# Patient Record
Sex: FEMALE | Race: WHITE | NOT HISPANIC OR LATINO | Employment: OTHER | URBAN - METROPOLITAN AREA
[De-identification: names, ages, dates, MRNs, and addresses within clinical notes are randomized per-mention and may not be internally consistent; named-entity substitution may affect disease eponyms.]

---

## 2017-01-17 ENCOUNTER — PATIENT OUTREACH (OUTPATIENT)
Dept: OTHER | Facility: OTHER | Age: 67
End: 2017-01-17
Payer: MEDICARE

## 2017-01-17 NOTE — PROGRESS NOTES
Last 5 Patient Entered Readings                                                               Current 30 Day Average: 137/74     Recent Readings 1/15/2017 1/15/2017 1/14/2017 1/13/2017 1/12/2017    Systolic BP (mmHg) 123 121 136 152 132    Diastolic BP (mmHg) 70 69 63 81 70        Outpatient Hypertension Medications as of 1/17/2017             amlodipine-valsartan-hcthiazid -25 mg Tab Take 1 tablet by mouth every evening.    carvedilol (COREG) 25 MG tablet Take 1 tablet (25 mg total) by mouth 2 (two) times daily with meals.    spironolactone (ALDACTONE) 25 MG tablet Take 1 tablet (25mg total) by mouth every morning for 1 week.  If well tolerated, increase to 2 tablets (50mg total) by mouth every morning.        Plan:   Called patient to follow up. Per current 30 day average, BP is well controlled. Patient denies having questions or concerns. Will continue to monitor. WCB in 3 months, sooner if BP begins to trend up or down.

## 2017-01-20 ENCOUNTER — PATIENT OUTREACH (OUTPATIENT)
Dept: OTHER | Facility: OTHER | Age: 67
End: 2017-01-20
Payer: MEDICARE

## 2017-01-20 NOTE — PROGRESS NOTES
"Last 5 Patient Entered Readings                                                               Current 30 Day Average: 137/73     Recent Readings 1/20/2017 1/19/2017 1/18/2017 1/15/2017 1/15/2017    Systolic BP (mmHg) 152 143 142 123 121    Diastolic BP (mmHg) 79 74 81 70 69    Pulse 91 87 86 78 79        Follow up with Ms. Vickie Hoskins completed. Patient is doing well. She hasn't been able to exercise lately because the weather has "nasty". Her mother-in-law also had knee replacement surgery so she has been helping take care of her. She plans on getting back soon. Ms. Hoskins reports following a low sodium diet. Patient did not have any further questions or concerns. I will follow up in a few weeks to see how she is doing and progressing.            "

## 2017-01-23 DIAGNOSIS — K21.9 GASTROESOPHAGEAL REFLUX DISEASE WITHOUT ESOPHAGITIS: Chronic | ICD-10-CM

## 2017-01-23 RX ORDER — OMEPRAZOLE 40 MG/1
CAPSULE, DELAYED RELEASE ORAL
Qty: 30 CAPSULE | Refills: 0 | Status: SHIPPED | OUTPATIENT
Start: 2017-01-23 | End: 2017-03-24 | Stop reason: SDUPTHER

## 2017-02-10 ENCOUNTER — PATIENT OUTREACH (OUTPATIENT)
Dept: OTHER | Facility: OTHER | Age: 67
End: 2017-02-10
Payer: MEDICARE

## 2017-02-16 ENCOUNTER — PATIENT MESSAGE (OUTPATIENT)
Dept: FAMILY MEDICINE | Facility: CLINIC | Age: 67
End: 2017-02-16

## 2017-02-16 DIAGNOSIS — F32.2 MAJOR DEPRESSIVE DISORDER, SINGLE EPISODE, SEVERE WITHOUT PSYCHOTIC FEATURES: ICD-10-CM

## 2017-02-16 DIAGNOSIS — I10 ESSENTIAL HYPERTENSION: ICD-10-CM

## 2017-02-16 DIAGNOSIS — K21.9 GASTROESOPHAGEAL REFLUX DISEASE WITHOUT ESOPHAGITIS: Chronic | ICD-10-CM

## 2017-02-16 RX ORDER — DULOXETIN HYDROCHLORIDE 60 MG/1
60 CAPSULE, DELAYED RELEASE ORAL DAILY
Qty: 90 CAPSULE | Refills: 0 | Status: SHIPPED | OUTPATIENT
Start: 2017-02-16 | End: 2017-04-19 | Stop reason: SDUPTHER

## 2017-02-16 RX ORDER — SPIRONOLACTONE 25 MG/1
TABLET ORAL
Qty: 180 TABLET | Refills: 3 | Status: SHIPPED | OUTPATIENT
Start: 2017-02-16 | End: 2017-05-11 | Stop reason: SDUPTHER

## 2017-02-16 RX ORDER — OMEPRAZOLE 40 MG/1
40 CAPSULE, DELAYED RELEASE ORAL DAILY
Qty: 90 CAPSULE | Refills: 3 | Status: SHIPPED | OUTPATIENT
Start: 2017-02-16

## 2017-02-17 DIAGNOSIS — E11.9 TYPE II OR UNSPECIFIED TYPE DIABETES MELLITUS WITHOUT MENTION OF COMPLICATION, NOT STATED AS UNCONTROLLED: Primary | ICD-10-CM

## 2017-02-17 NOTE — TELEPHONE ENCOUNTER
Left message for patient to call our office.    Spoke w/ patient, she is requesting to see a eye doctor. A referral was already placed on today for Optometry by Dr. Graff.

## 2017-02-23 ENCOUNTER — OFFICE VISIT (OUTPATIENT)
Dept: OPTOMETRY | Facility: CLINIC | Age: 67
End: 2017-02-23
Payer: MEDICARE

## 2017-02-23 DIAGNOSIS — E11.9 TYPE 2 DIABETES MELLITUS WITHOUT OPHTHALMIC MANIFESTATIONS: Primary | ICD-10-CM

## 2017-02-23 DIAGNOSIS — H40.003 GLAUCOMA SUSPECT, BILATERAL: ICD-10-CM

## 2017-02-23 DIAGNOSIS — H53.002 AMBLYOPIA, LEFT: ICD-10-CM

## 2017-02-23 DIAGNOSIS — Z96.1 PSEUDOPHAKIA: ICD-10-CM

## 2017-02-23 DIAGNOSIS — H43.811 POSTERIOR VITREOUS DETACHMENT, RIGHT: ICD-10-CM

## 2017-02-23 DIAGNOSIS — H52.7 REFRACTIVE ERROR: ICD-10-CM

## 2017-02-23 DIAGNOSIS — H50.112 EXOTROPIA, LEFT EYE: ICD-10-CM

## 2017-02-23 PROCEDURE — 99999 PR PBB SHADOW E&M-EST. PATIENT-LVL I: CPT | Mod: PBBFAC,,, | Performed by: OPTOMETRIST

## 2017-02-23 PROCEDURE — 92014 COMPRE OPH EXAM EST PT 1/>: CPT | Mod: S$GLB,,, | Performed by: OPTOMETRIST

## 2017-02-23 PROCEDURE — 92015 DETERMINE REFRACTIVE STATE: CPT | Mod: S$GLB,,, | Performed by: OPTOMETRIST

## 2017-02-23 NOTE — LETTER
February 23, 2017      Pari Graff MD  [address]  [place] [address]           [facility]  [address]  [place] [address]  Phone: [phone]  Fax: [phone]          Patient: Vickie Hoskins   MR Number: [id number]   YOB: 1950   Date of Visit: 2/23/2017       Dear Dr. Pari Graff:    Thank you for referring Vickie Hoskins to me for evaluation. Attached you will find relevant portions of my assessment and plan of care.    If you have questions, please do not hesitate to call me. I look forward to following Vickie Hoskins along with you.    Sincerely,    Daniella Fernández, OD    Enclosure  CC:  No Recipients    If you would like to receive this communication electronically, please contact [email or web address] or [phone] to request more information on [employer] Link access.    For providers and/or their staff who would like to refer a patient to [facility], please contact us through our one-stop-shop provider referral line, [facility], at [phone].    If you feel you have received this communication in error or would no longer like to receive these types of communications, please e-mail [email or web address]

## 2017-02-23 NOTE — PROGRESS NOTES
HPI     Dls: 2/9/16 Dr. Servin    Pt here for diabetic and hypertensive eye exam, and glaucoma ck.   Pt c/o yellow spots od for a while now was seeing blue lines in vision not   any more. Pt c/o blurry vision od off/on pt c/o mucous ou tearing ou no   itching no burning no pain ha's. Pt wears pal's.     Eye meds:   None    Hemoglobin A1C       Date                     Value               Ref Range             Status                10/11/2016               6.9 (H)             4.5 - 6.2 %           Final                  04/08/2016               10.2 (H)            4.5 - 6.2 %           Final                 11/03/2015               9.3 (H)             4.5 - 6.2 %           Final            ----------    LBS - reports sugars are up and down due to COPD meds, this morning was   202    Family OHx  (--) glaucoma  (--) AMD      H/o strabismus OS s/p surgery, amblyopia OS       Last edited by Daniella Fernández, OD on 2/23/2017  1:36 PM.     Assessment /Plan     For exam results, see Encounter Report.    Type 2 diabetes mellitus without ophthalmic manifestations  - No diabetic retinopathy. Educated patient on importance of good blood sugar control, compliance with meds, and follow up care with PCP. Return in 1 year for dilated eye exam, sooner PRN.    Posterior vitreous detachment, right  - Discussed causes of flashes and floaters with the patient and described the warnings of a possible retinal detachment. Advised patient to call if there is an increase in flashes or floaters    Glaucoma suspect, bilateral  - H/o large CDR - stable, healthy rim. Monitor.     Pseudophakia  - Well-centered. Clear.     Exotropia, left eye  - S/p surgery as a child. Monitor.     Amblyopia, left  - BCVA OS 20/40. Monitor.    Refractive error  - Educated patient on refractive error and discussed lens options. Gave pt Rx for specs. RTC in 1 year.    Eyeglass Final Rx     Eyeglass Final Rx      Sphere Cylinder Axis Add   Right -1.50 +0.50 005 +2.50   Left  -1.25 +1.50 170 +2.50       Type:  PAL    Expiration Date:  2/24/2018              rtc 1 yr

## 2017-03-01 ENCOUNTER — PATIENT OUTREACH (OUTPATIENT)
Dept: OTHER | Facility: OTHER | Age: 67
End: 2017-03-01
Payer: MEDICARE

## 2017-03-01 NOTE — PROGRESS NOTES
Last 5 Patient Entered Readings                                                               Current 30 Day Average: 142/76     Recent Readings 2/28/2017 2/26/2017 2/26/2017 2/24/2017 2/23/2017    Systolic BP (mmHg) 153 150 120 154 149    Diastolic BP (mmHg) 78 82 86 80 82    Pulse 79 90 90 83 93        Follow up with Ms. Vickie Hoskins completed. Ms. Hoskins is unsure of what could be causing her readings to be elevated. She has been monitoring her sodium intake, but she has not been exercising over the past few weeks. She is still helping take care of her mother-in-law following knee surgery. Patient did not have any further questions or concerns. I will follow up in a few weeks to see how she is doing and progressing.

## 2017-03-15 ENCOUNTER — PATIENT OUTREACH (OUTPATIENT)
Dept: OTHER | Facility: OTHER | Age: 67
End: 2017-03-15
Payer: MEDICARE

## 2017-03-15 DIAGNOSIS — I10 ESSENTIAL HYPERTENSION: ICD-10-CM

## 2017-03-15 RX ORDER — AMLODIPINE, VALSARTAN AND HYDROCHLOROTHIAZIDE 10; 320; 25 MG/1; MG/1; MG/1
TABLET ORAL
Qty: 90 TABLET | Refills: 1 | Status: SHIPPED | OUTPATIENT
Start: 2017-03-15 | End: 2017-03-17

## 2017-03-15 NOTE — PROGRESS NOTES
Last 5 Patient Entered Readings                                                               Current 30 Day Average: 143/76     Recent Readings 3/14/2017 3/12/2017 3/12/2017 3/9/2017 3/7/2017    Systolic BP (mmHg) 124 120 129 143 146    Diastolic BP (mmHg) 70 69 58 76 81    Pulse 78 86 86 87 86        Follow up with Ms. Vickie Hoskins completed. Ms. Hoskins is doing well. Patient reports that she has not been able to get back to the gym, but she has been walking around her neighborhood. She is maintaining her diet. Patient did not have any further questions or concerns. I will follow up in a few weeks to see how she is doing and progressing.

## 2017-03-16 ENCOUNTER — PATIENT MESSAGE (OUTPATIENT)
Dept: FAMILY MEDICINE | Facility: CLINIC | Age: 67
End: 2017-03-16

## 2017-03-17 ENCOUNTER — OFFICE VISIT (OUTPATIENT)
Dept: FAMILY MEDICINE | Facility: CLINIC | Age: 67
End: 2017-03-17
Payer: MEDICARE

## 2017-03-17 VITALS
BODY MASS INDEX: 36.78 KG/M2 | RESPIRATION RATE: 18 BRPM | DIASTOLIC BLOOD PRESSURE: 80 MMHG | SYSTOLIC BLOOD PRESSURE: 142 MMHG | HEART RATE: 86 BPM | OXYGEN SATURATION: 94 % | WEIGHT: 207.56 LBS | HEIGHT: 63 IN

## 2017-03-17 DIAGNOSIS — Z79.4 TYPE 2 DIABETES MELLITUS WITH DIABETIC NEUROPATHY, WITH LONG-TERM CURRENT USE OF INSULIN: Chronic | ICD-10-CM

## 2017-03-17 DIAGNOSIS — E66.01 SEVERE OBESITY (BMI 35.0-35.9 WITH COMORBIDITY): ICD-10-CM

## 2017-03-17 DIAGNOSIS — Z00.00 ENCOUNTER FOR PREVENTIVE HEALTH EXAMINATION: Primary | ICD-10-CM

## 2017-03-17 DIAGNOSIS — G47.33 OSA (OBSTRUCTIVE SLEEP APNEA): Chronic | ICD-10-CM

## 2017-03-17 DIAGNOSIS — F33.2 MAJOR DEPRESSIVE DISORDER, RECURRENT, SEVERE WITHOUT PSYCHOTIC FEATURES: Chronic | ICD-10-CM

## 2017-03-17 DIAGNOSIS — F17.201 TOBACCO DEPENDENCE IN REMISSION: ICD-10-CM

## 2017-03-17 DIAGNOSIS — E11.3299 DIABETES MELLITUS WITH BACKGROUND RETINOPATHY: Chronic | ICD-10-CM

## 2017-03-17 DIAGNOSIS — I10 ESSENTIAL HYPERTENSION: Chronic | ICD-10-CM

## 2017-03-17 DIAGNOSIS — E78.5 HYPERLIPIDEMIA, UNSPECIFIED HYPERLIPIDEMIA TYPE: Chronic | ICD-10-CM

## 2017-03-17 DIAGNOSIS — K21.9 GASTROESOPHAGEAL REFLUX DISEASE WITHOUT ESOPHAGITIS: Chronic | ICD-10-CM

## 2017-03-17 DIAGNOSIS — J44.9 MODERATE COPD (CHRONIC OBSTRUCTIVE PULMONARY DISEASE): ICD-10-CM

## 2017-03-17 DIAGNOSIS — I77.1 TORTUOUS AORTA: Chronic | ICD-10-CM

## 2017-03-17 DIAGNOSIS — M89.9 DISORDER OF BONE: ICD-10-CM

## 2017-03-17 DIAGNOSIS — K76.0 NAFLD (NONALCOHOLIC FATTY LIVER DISEASE): Chronic | ICD-10-CM

## 2017-03-17 DIAGNOSIS — E03.9 ACQUIRED HYPOTHYROIDISM: Chronic | ICD-10-CM

## 2017-03-17 DIAGNOSIS — E11.40 TYPE 2 DIABETES MELLITUS WITH DIABETIC NEUROPATHY, WITH LONG-TERM CURRENT USE OF INSULIN: Chronic | ICD-10-CM

## 2017-03-17 PROCEDURE — 3077F SYST BP >= 140 MM HG: CPT | Mod: S$GLB,,, | Performed by: NURSE PRACTITIONER

## 2017-03-17 PROCEDURE — 3079F DIAST BP 80-89 MM HG: CPT | Mod: S$GLB,,, | Performed by: NURSE PRACTITIONER

## 2017-03-17 PROCEDURE — 99499 UNLISTED E&M SERVICE: CPT | Mod: S$GLB,,, | Performed by: NURSE PRACTITIONER

## 2017-03-17 PROCEDURE — 99999 PR PBB SHADOW E&M-EST. PATIENT-LVL V: CPT | Mod: PBBFAC,,, | Performed by: NURSE PRACTITIONER

## 2017-03-17 PROCEDURE — G0439 PPPS, SUBSEQ VISIT: HCPCS | Mod: S$GLB,,, | Performed by: NURSE PRACTITIONER

## 2017-03-17 RX ORDER — AMLODIPINE BESYLATE 10 MG/1
10 TABLET ORAL DAILY
Qty: 90 TABLET | Refills: 4 | Status: SHIPPED | OUTPATIENT
Start: 2017-03-17 | End: 2017-03-24

## 2017-03-17 RX ORDER — VALSARTAN AND HYDROCHLOROTHIAZIDE 320; 25 MG/1; MG/1
1 TABLET, FILM COATED ORAL DAILY
Qty: 90 TABLET | Refills: 4 | Status: SHIPPED | OUTPATIENT
Start: 2017-03-17 | End: 2017-03-24

## 2017-03-17 NOTE — PATIENT INSTRUCTIONS
Back Care Tips    Caring for your back  These are things you can do to prevent a recurrence of acute back pain and to reduce symptoms from chronic back pain:  · Maintain a healthy weight. If you are overweight, losing weight will help most types of back pain.  · Exercise is an important part of recovery from most types of back pain. The muscles behind and in front of the spine support the back. This means strengthening both the back muscles and the abdominal muscles will provide better support for your spine.   · Swimming and brisk walking are good overall exercises to improve your fitness level.  · Practice safe lifting methods (below).  · Practice good posture when sitting, standing and walking. Avoid prolonged sitting. This puts more stress on the lower back than standing or walking.  · Wear quality shoes with sufficient arch support. Foot and ankle alignment can affect back symptoms. Women should avoid wearing high heels.  · Therapeutic massage can help relax the back muscles without stretching them.  · During the first 24 to 72 hours after an acute injury or flare-up of chronic back pain, apply an ice pack to the painful area for 20 minutes and then remove it for 20 minutes, over a period of 60 to 90 minutes, or several times a day. As a safety precaution, do not use a heating pad at bedtime. Sleeping on a heating pad can lead to skin burns or tissue damage.  · You can alternate ice and heat therapies.  Medications  Talk to your healthcare provider before using medicines, especially if you have other medical problems or are taking other medicines.  · You may use acetaminophen or ibuprofen to control pain, unless your healthcare provider prescribed other pain medicine. If you have chronic conditions like diabetes, liver or kidney disease, stomach ulcers, or gastrointestinal bleeding, or are taking blood thinners, talk with your healthcare provider before taking any medicines.  · Be careful if you are given  prescription pain medicines, narcotics, or medicine for muscle spasm. They can cause drowsiness, affect your coordination, reflexes, and judgment. Do not drive or operate heavy machinery while taking these types of medicines. Take prescription pain medicine only as prescribed by your healthcare provider.  Lumbar stretch  Here is a simple stretching exercise that will help relax muscle spasm and keep your back more limber. If exercise makes your back pain worse, dont do it.  · Lie on your back with your knees bent and both feet on the ground.  · Slowly raise your left knee to your chest as you flatten your lower back against the floor. Hold for 5 seconds.  · Relax and repeat the exercise with your right knee.  · Do 10 of these exercises for each leg.  Safe lifting method  · Dont bend over at the waist to lift an object off the floor.  Instead, bend your knees and hips in a squat.   · Keep your back and head upright  · Hold the object close to your body, directly in front of you.  · Straighten your legs to lift the object.   · Lower the object to the floor in the reverse fashion.  · If you must slide something across the floor, push it.  Posture tips  Sitting  Sit in chairs with straight backs or low-back support. Keep your knees lower than your hips, with your feet flat on the floor.  When driving, sit up straight. Adjust the seat forward so you are not leaning toward the steering wheel.  A small pillow or rolled towel behind your lower back may help if you are driving long distances.   Standing  When standing for long periods, shift most of your weight to one leg at a time. Alternate legs every few minutes.   Sleeping  The best way to sleep is on your side with your knees bent. Put a low pillow under your head to support your neck in a neutral spine position. Avoid thick pillows that bend your neck to one side. Put a pillow between your legs to further relax your lower back. If you sleep on your back, put pillows  under your knees to support your legs in a slightly flexed position. Use a firm mattress. If your mattress sags, replace it, or use a 1/2-inch plywood board under the mattress to add support.  Follow-up care  Follow up with your healthcare provider, or as advised.  If X-rays, a CT scan or an MRI scan were taken, they will be reviewed by a radiologist. You will be notified of any new findings that may affect your care.  Call 911  Seek emergency medical care if any of the following occur:  · Trouble breathing  · Confusion  · Very drowsy  · Fainting or loss of consciousness  · Rapid or very slow heart rate  · Loss of  bowel or bladder control  When to seek medical care  Call your healthcare provider if any of the following occur:  · Pain becomes worse or spreads to your arms or legs  · Weakness or numbness in one or both arms or legs  · Numbness in the groin area  Date Last Reviewed: 6/1/2016  © 8865-5567 [employer]. [address] [place] [address]. All rights reserved. This information is not intended as a substitute for professional medical care. Always follow your healthcare professional's instructions.        Self-Care for Low Back Pain    Most people have low back pain now and then. In many cases, it isnt serious and self-care can help. Sometimes low back pain can be a sign of a bigger problem. Call your healthcare provider if your pain returns often or gets worse over time. For the long-term care of your back, get regular exercise, lose any excess weight and learn good posture.  Take a short rest  Lying down during the day may be beneficial for short periods of time if severe pain increases with sitting or standing. Long-term bed rest could be detrimental.  Reduce pain and swelling  Cold reduces swelling. Both cold and heat can reduce pain. Protect your skin by placing a towel between your body and the ice or heat source.  · For the first few days, apply an ice pack for 15 to 20 minutes  .  · After the first few days, try heat for 15 minutes at a time to ease pain. Never sleep on a heating pad.  · Over-the-counter medicine can help control pain and swelling. Try aspirin or ibuprofen.  Exercise  Exercise can help your back heal. It also helps your back get stronger and more flexible, preventing any reinjury. Ask your healthcare provider about specific exercises for your back.  Use good posture to avoid reinjury  · When moving, bend at the hips and knees. Dont bend at the waist or twist around.  · When lifting, keep the object close to your body. Dont try to lift more than you can handle.  · When sitting, keep your lower back supported. Use a rolled-up towel as needed.  Seek immediate medical care if:  · Youre unable to stand or walk.  · You have a temperature over 100.4°F (38.0°C)  · You have frequent, painful, or bloody urination.  · You have severe abdominal pain.  · You have a sharp, stabbing pain.  · Your pain is constant.  · You have pain or numbness in your leg.  · You feel pain in a new area of your back.  · You notice that the pain isnt decreasing after more than a week.   Date Last Reviewed: 9/29/2015  © 3232-1855 [employer]. [address] [place] [address], [place] [address]. All rights reserved. This information is not intended as a substitute for professional medical care. Always follow your healthcare professional's instructions.        Counseling and Referral of Other Preventative  (Italic type indicates deductible and co-insurance are waived)    Patient Name: Vickie Hoskins  Today's Date: 3/19/2017      SERVICE LIMITATIONS RECOMMENDATION    Vaccines    · Pneumococcal (once after 65)    · Influenza (annually)    · Hepatitis B (if medium/high risk)    · Prevnar 13      Hepatitis B medium/high risk factors:       - End-stage renal disease       - Hemophiliacs who received Factor VII or         IX concentrates       - Clients of institutions for the mentally             retarded        - Persons who live in the same house as          a HepB carrier       - Homosexual men       - Illicit injectable drug abusers     Pneumococcal: Done, no repeat necessary     Influenza: Done, repeat in one year     Hepatitis B: Done, repeat at next scheduled date     Prevnar 13: Done, repeat at next scheduled date    Mammogram (biennial age 50-74)  Annually (age 40 or over)  completed April 2016, scheduled repeat 1 year     Pap (up to age 70 and after 70 if unknown history or abnormal study last 10 years)    N/A, no clinical risk factors          Colorectal cancer screening (to age 75)    · Fecal occult blood test (annual)  · Flexible sigmoidoscopy (5y)  · Screening colonoscopy (10y)  · Barium enema   completed 2014.     Diabetes self-management training (no USPSTF recommendations)  Requires referral by treating physician for patient with diabetes or renal disease. 10 hours of initial DSMT sessions of no less than 30 minutes each in a continuous 12-month period. 2 hours of follow-up DSMT in subsequent years.  per patient and PCP     Bone mass measurements (age 65 & older, biennial)  Requires diagnosis related to osteoporosis or estrogen deficiency. Biennial benefit unless patient has history of long-term glucocorticoid  ordered.     Glaucoma screening (no USPSTF recommendation)  Diabetes mellitus, family history   , age 50 or over    American, age 65 or over  completed February 2017    Medical nutrition therapy for diabetes or renal disease (no recommended schedule)  Requires referral by treating physician for patient with diabetes or renal disease or kidney transplant within the past 3 years.  Can be provided in same year as diabetes self-management training (DSMT), and CMS recommends medical nutrition therapy take place after DSMT. Up to 3 hours for initial year and 2 hours in subsequent years.  per patient and PCP    Cardiovascular screening blood tests (every 5 years)  · Fasting lipid  panel  Order as a panel if possible  completed April 2016, repeat every 1 year.     Diabetes screening tests (at least every 3 years, Medicare covers annually or at 6-month intervals for prediabetic patients)  · Fasting blood sugar (FBS) or glucose tolerance test (GTT)  Patient must be diagnosed with one of the following:       - Hypertension       - Dyslipidemia       - Obesity (BMI 30kg/m2)       - Previous elevated impaired FBS or GTT       ... or any two of the following:       - Overweight (BMI 25 but <30)       - Family history of diabetes       - Age 65 or older       - History of gestational diabetes or birth of baby weighing more than 9 pounds  completed October 2016 repeated every 6 months     Abdominal aortic aneurysm screening (once)  · Sonogram   Limited to patients who meet one of the following criteria:       - Men who are 65-75 years old and have smoked more than 100 cigarette in their lifetime       - Anyone with a family history of abdominal aortic aneurysm       - Anyone recommended for screening by the USPSTF  completed May 2016    HIV screening (annually for increased risk patients)  · HIV-1 and HIV-2 by EIA, or EDGAR, rapid antibody test or oral mucosa transudate  Patients must be at increased risk for HIV infection per USPSTF guidelines or pregnant. Tests covered annually for patient at increased risk or as requested by the patient. Pregnant patients may receive up to 3 tests during pregnancy.  No clinical risk factors     Smoking cessation counseling (up to 8 sessions per year)  Patients must be asymptomatic of tobacco-related conditions to receive as a preventative service.  Counseled for 3-10 minutes.    Subsequent annual wellness visit  At least 12 months since last AWV  Return in one year     The following information is provided to all patients.  This information is to help you find resources for any of the problems found today that may be affecting your health:                Living  healthy guide: www.[facility].[place].[facility]      Understanding Diabetes: www.diabetes.org      Eating healthy: www.cdc.gov/healthyweight      CDC home safety checklist: www.cdc.gov/steadi/patient.html      Agency on Aging: www.goea.[place].[facility]      Alcoholics anonymous (AA): www.aa.org      Physical Activity: www.gosia.nih.gov/mr6cztj      Tobacco use: www.quitwithusla.org

## 2017-03-20 NOTE — PROGRESS NOTES
"Vickie Hoskins presented for a  Medicare AWV and comprehensive Health Risk Assessment today. The following components were reviewed and updated:    · Medical history  · Family History  · Social history  · Allergies and Current Medications  · Health Risk Assessment  · Health Maintenance  · Care Team     ** See Completed Assessments for Annual Wellness Visit within the encounter summary.**       The following assessments were completed:  · Living Situation  · CAGE  · Depression Screening  · Timed Get Up and Go  · Whisper Test  · Cognitive Function Screening  · Nutrition Screening  · ADL Screening  · PAQ Screening    Vitals:    03/17/17 1330   BP: (!) 142/80   BP Location: Left arm   Patient Position: Sitting   BP Method: Manual   Pulse: 86   Resp: 18   SpO2: (!) 94%   Weight: 94.2 kg (207 lb 9 oz)   Height: 5' 3" (1.6 m)     Body mass index is 36.77 kg/(m^2).  Physical Exam   Constitutional: She is oriented to person, place, and time.   Cardiovascular: Normal rate, regular rhythm and normal heart sounds.    Pulses:       Dorsalis pedis pulses are 2+ on the right side, and 2+ on the left side.        Posterior tibial pulses are 2+ on the right side, and 2+ on the left side.   Pulmonary/Chest: Effort normal.   Musculoskeletal: Normal range of motion. She exhibits no edema.        Right foot: There is normal range of motion and no deformity.        Left foot: There is normal range of motion and no deformity.   Feet:   Right Foot:   Protective Sensation: 6 sites tested. 4 sites sensed.   Skin Integrity: Negative for ulcer, blister, skin breakdown, erythema, warmth, callus or dry skin.   Left Foot:   Protective Sensation: 6 sites tested. 5 sites sensed.   Skin Integrity: Negative for ulcer, blister, skin breakdown, erythema, warmth, callus or dry skin.   Neurological: She is alert and oriented to person, place, and time.   Skin: Skin is warm.   Psychiatric: She has a normal mood and affect. Her behavior is normal. Thought " content normal.   Vitals reviewed.        Diagnoses and health risks identified today and associated recommendations/orders:    1. Encounter for preventive health examination  Education provided about preventive health examinations and procedures; discussed patient's health concerns, holistically addressed patient's health plan.  Reviewed medical record per discussion with patient for health risks, which are addressed in this documentation.     2. Major depressive disorder, recurrent, severe without psychotic features  Stable, evaluated by [facility] psychiatry, denies worsening symptoms, denies homicidal or suicidal ideation, reports medication is therapeutic; continue as advised.     3. Tortuous aorta  Stable, asymptomatic on ASA, STATIN, and antihypertensives; monitor    4. Severe obesity (BMI 35.0-35.9 with comorbidity)  Reminded patient of [employer] benefits with access to fitness centers and classes.   We discussed diet and exercise for weight loss.  Educated about diet, activities, and ways to avoid sedentary lifestyle.   Educated about medications, diet compliance, lifestyle choices, health risks associated with obesity.      5. Type 2 diabetes mellitus with diabetic neuropathy, with long-term current use of insulin  Education provided about diabetes, management of blood glucose with diet and activities, monitoring for worsening effects of diabetes.  Reviewed Ha1c (6.9 - October 2016), complications associated with uncontrolled diabetes.   - Ambulatory referral to Podiatry    6. Type 2 diabetes mellitus, uncontrolled, with neuropathy  Education provided about diabetes, management of blood glucose with diet and activities, monitoring for worsening effects of diabetes.  Reviewed Ha1c (6.9 - October 2016), complications associated with uncontrolled diabetes.   - Ambulatory referral to Podiatry    7. Diabetes mellitus with background retinopathy  Education provided about diabetes, management of blood  glucose with diet and activities, monitoring for worsening effects of diabetes.  Reviewed Ha1c (6.9 - October 2016), complications associated with uncontrolled diabetes. Evaluated by optometry February 2017.     8. Moderate COPD (chronic obstructive pulmonary disease)  Stable, followed by [facility] pulmonology dept, denies worsening symptoms; continue as advised.     9. Tobacco dependence in remission  Symptoms controlled. Continuing current management.    10. Essential hypertension  Stable, patient educated about medications, diet compliance, lifestyle choices, health risks associated with uncontrolled HTN.     11. Acquired hypothyroidism  TSH 0.693 (July 2016). The current medical regimen is effective;  continue present plan and medications.    12. AMBAR (obstructive sleep apnea)  Symptoms controlled. Continuing current management.  Stable, followed by [facility] pulmonology and sleep medicine dept, denies worsening symptoms; continue as advised.     13. Hyperlipidemia, unspecified hyperlipidemia type  Educated about diet, activities, and ways to avoid sedentary lifestyle.   Reminded patient of [employer] benefits with access to fitness centers and classes.   Lipid panel (April 2016) reflects elevated cholesterol. Advised about diet and cholesterol management.     14. NAFLD (nonalcoholic fatty liver disease)  Stable, asymptomatic; monitor.     15. Gastroesophageal reflux disease without esophagitis  Symptoms controlled. Continuing current management.  We discussed trigger foods, postprandial body positioning.     16. Disorder of bone  - DXA Bone Density Spine And Hip; Future      No Follow-up on file.    Dashawn Roman Jr, NP

## 2017-03-24 ENCOUNTER — OFFICE VISIT (OUTPATIENT)
Dept: FAMILY MEDICINE | Facility: CLINIC | Age: 67
End: 2017-03-24
Payer: MEDICARE

## 2017-03-24 VITALS
BODY MASS INDEX: 36.8 KG/M2 | OXYGEN SATURATION: 96 % | TEMPERATURE: 98 F | HEART RATE: 86 BPM | WEIGHT: 207.69 LBS | DIASTOLIC BLOOD PRESSURE: 80 MMHG | SYSTOLIC BLOOD PRESSURE: 118 MMHG | HEIGHT: 63 IN

## 2017-03-24 DIAGNOSIS — E11.3299 DIABETES MELLITUS WITH BACKGROUND RETINOPATHY: Primary | Chronic | ICD-10-CM

## 2017-03-24 DIAGNOSIS — S49.91XA RIGHT SHOULDER INJURY, INITIAL ENCOUNTER: ICD-10-CM

## 2017-03-24 DIAGNOSIS — I10 ESSENTIAL HYPERTENSION: Chronic | ICD-10-CM

## 2017-03-24 DIAGNOSIS — F33.2 MAJOR DEPRESSIVE DISORDER, RECURRENT, SEVERE WITHOUT PSYCHOTIC FEATURES: Chronic | ICD-10-CM

## 2017-03-24 DIAGNOSIS — G89.29 CHRONIC BILATERAL LOW BACK PAIN WITHOUT SCIATICA: ICD-10-CM

## 2017-03-24 DIAGNOSIS — J44.9 MODERATE COPD (CHRONIC OBSTRUCTIVE PULMONARY DISEASE): ICD-10-CM

## 2017-03-24 DIAGNOSIS — E03.9 ACQUIRED HYPOTHYROIDISM: Chronic | ICD-10-CM

## 2017-03-24 DIAGNOSIS — M54.50 CHRONIC BILATERAL LOW BACK PAIN WITHOUT SCIATICA: ICD-10-CM

## 2017-03-24 PROCEDURE — 4010F ACE/ARB THERAPY RXD/TAKEN: CPT | Mod: S$GLB,,, | Performed by: FAMILY MEDICINE

## 2017-03-24 PROCEDURE — 99999 PR PBB SHADOW E&M-EST. PATIENT-LVL III: CPT | Mod: PBBFAC,,, | Performed by: FAMILY MEDICINE

## 2017-03-24 PROCEDURE — 1125F AMNT PAIN NOTED PAIN PRSNT: CPT | Mod: S$GLB,,, | Performed by: FAMILY MEDICINE

## 2017-03-24 PROCEDURE — 20605 DRAIN/INJ JOINT/BURSA W/O US: CPT | Mod: RT,S$GLB,, | Performed by: FAMILY MEDICINE

## 2017-03-24 PROCEDURE — 1160F RVW MEDS BY RX/DR IN RCRD: CPT | Mod: S$GLB,,, | Performed by: FAMILY MEDICINE

## 2017-03-24 PROCEDURE — 3074F SYST BP LT 130 MM HG: CPT | Mod: S$GLB,,, | Performed by: FAMILY MEDICINE

## 2017-03-24 PROCEDURE — 99214 OFFICE O/P EST MOD 30 MIN: CPT | Mod: 25,S$GLB,, | Performed by: FAMILY MEDICINE

## 2017-03-24 PROCEDURE — 3044F HG A1C LEVEL LT 7.0%: CPT | Mod: S$GLB,,, | Performed by: FAMILY MEDICINE

## 2017-03-24 PROCEDURE — 1157F ADVNC CARE PLAN IN RCRD: CPT | Mod: S$GLB,,, | Performed by: FAMILY MEDICINE

## 2017-03-24 PROCEDURE — 1159F MED LIST DOCD IN RCRD: CPT | Mod: S$GLB,,, | Performed by: FAMILY MEDICINE

## 2017-03-24 PROCEDURE — 3079F DIAST BP 80-89 MM HG: CPT | Mod: S$GLB,,, | Performed by: FAMILY MEDICINE

## 2017-03-24 RX ORDER — TRIAMCINOLONE ACETONIDE 40 MG/ML
40 INJECTION, SUSPENSION INTRA-ARTICULAR; INTRAMUSCULAR
Status: DISCONTINUED | OUTPATIENT
Start: 2017-03-24 | End: 2017-04-09 | Stop reason: HOSPADM

## 2017-03-24 RX ORDER — BUPROPION HYDROCHLORIDE 150 MG/1
150 TABLET ORAL DAILY
Qty: 30 TABLET | Refills: 0 | Status: SHIPPED | OUTPATIENT
Start: 2017-03-24 | End: 2017-03-24 | Stop reason: SDUPTHER

## 2017-03-24 RX ORDER — AMLODIPINE, VALSARTAN AND HYDROCHLOROTHIAZIDE 10; 320; 25 MG/1; MG/1; MG/1
TABLET ORAL DAILY
COMMUNITY
End: 2017-07-06

## 2017-03-24 RX ORDER — BUPROPION HYDROCHLORIDE 150 MG/1
150 TABLET ORAL DAILY
Qty: 90 TABLET | Refills: 1 | Status: SHIPPED | OUTPATIENT
Start: 2017-03-24 | End: 2017-05-19 | Stop reason: SDUPTHER

## 2017-03-24 RX ORDER — INSULIN HUMAN 500 [IU]/ML
100 INJECTION, SOLUTION SUBCUTANEOUS 2 TIMES DAILY
Qty: 24 ML | Refills: 0 | Status: SHIPPED | OUTPATIENT
Start: 2017-03-24 | End: 2017-04-24 | Stop reason: SDUPTHER

## 2017-03-24 RX ADMIN — TRIAMCINOLONE ACETONIDE 40 MG: 40 INJECTION, SUSPENSION INTRA-ARTICULAR; INTRAMUSCULAR at 02:03

## 2017-03-24 NOTE — PROGRESS NOTES
Office Visit    Patient Name: Vickie Hoskins    : 1950  MRN: [id number]    Subjective:  Vickie is a 66 y.o. female who presents today for:    Back Pain and Shoulder Pain (pt states injury to right shoulder 2 yrs ago )      This patient has multiple medical diagnoses as noted below.  This patient is known to me and to this clinic. She reports that her Back pain is severe.  No improvement since last visit.  She reports a crunching of her back that occurs with standing or washing dishes     She has elevations in blood glucose greater than 200.      Active Problem List  Patient Active Problem List   Diagnosis    Essential hypertension    Severe obesity (BMI 35.0-35.9 with comorbidity)    Acquired hypothyroidism    Insomnia    Osteoarthritis    AMBAR (obstructive sleep apnea)    Migraine    Diabetes mellitus with background retinopathy    Pseudophakia    Exotropia    Amblyopia    Type 2 diabetes mellitus, uncontrolled, with neuropathy    NAFLD (nonalcoholic fatty liver disease)    Open angle with borderline findings, low risk    Long-term insulin use in type 2 diabetes    Esophageal reflux    History of anemia    Major depressive disorder, recurrent, severe without psychotic features    Hyperlipidemia    Rotator cuff impingement syndrome of right shoulder    Tear of right glenoid labrum    Bursitis of right shoulder    Tortuous aorta    Hx of wheezing    Tobacco dependence in remission    Moderate COPD (chronic obstructive pulmonary disease)       Past Surgical History  Past Surgical History:   Procedure Laterality Date    APPENDECTOMY   approx    incidental     CATARACT EXTRACTION Bilateral     CHOLECYSTECTOMY   approx    EYE SURGERY Bilateral 2012    cataract w/IOL    FOOT SURGERY Left 10/23/15    left hindfoot calcanectomy and bursectomy    HYSTERECTOMY      SOLANGE     STRABISMUS SURGERY Left at age 1        Family History  Family History   Problem Relation Age of  Onset    Cancer Mother      unsure what kind    Heart disease Father     Hypertension Father     Alcohol abuse Father     Cancer Maternal Aunt      bone cancer    Diabetes Maternal Aunt     Hypertension Sister     Alcohol abuse Sister     Hypertension Son     Alcohol abuse Son      DWI    Hypertension Son     Alcohol abuse Son      DWI    Hypertension Paternal Uncle     Heart disease Paternal Uncle     Hypertension Maternal Grandmother     Heart disease Maternal Grandmother     Cancer Maternal Aunt     Mental illness Cousin     No Known Problems Brother     No Known Problems Maternal Uncle     No Known Problems Paternal Aunt     No Known Problems Maternal Grandfather     No Known Problems Paternal Grandmother     No Known Problems Paternal Grandfather     Stroke Neg Hx     Mental retardation Neg Hx     Drug abuse Neg Hx     Amblyopia Neg Hx     Blindness Neg Hx     Cataracts Neg Hx     Glaucoma Neg Hx     Macular degeneration Neg Hx     Retinal detachment Neg Hx     Strabismus Neg Hx     Thyroid disease Neg Hx        Social History  Social History     Social History    Marital status:      Spouse name: N/A    Number of children: N/A    Years of education: N/A     Occupational History    Not on file.     Social History Main Topics    Smoking status: Former Smoker     Packs/day: 1.00     Years: 30.00     Types: Cigarettes     Quit date: 1985    Smokeless tobacco: Never Used    Alcohol use 0.0 oz/week     0 Standard drinks or equivalent per week      Comment: maybe once a year    Drug use: No    Sexual activity: Not Currently     Partners: Male     Other Topics Concern    Not on file     Social History Narrative    Pt's  is  since , currently lives with her sister and brother in law. She has 3 children ( One son with HTN but otherwise in good health)Sons live in [facility], and daughter in [place], [place]. 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 Coffee intake 1-2 cups a day. She does not have Living Will or Advanced Directive.        Current Medications  Medications reviewed and updated.     Allergies   Review of patient's allergies indicates:   Allergen Reactions    Pcn [penicillins] Other (See Comments)     Pt was told by mother that she was allergic to PCN.    Silver nitrate Other (See Comments)     Remained on skin more than week       Review of Systems (Pertinent positives)  Review of Systems   Constitutional: Negative for activity change, appetite change, fatigue, fever and unexpected weight change.   HENT: Negative.  Negative for ear discharge, ear pain, rhinorrhea and sore throat.    Eyes: Negative.    Respiratory: Negative for apnea, cough, chest tightness, shortness of breath and wheezing.    Cardiovascular: Negative for chest pain, palpitations and leg swelling.   Gastrointestinal: Negative for abdominal distention, abdominal pain, constipation, diarrhea and vomiting.   Endocrine: Negative for cold intolerance, heat intolerance, polydipsia and polyuria.   Genitourinary: Negative for decreased urine volume, menstrual problem, urgency, vaginal bleeding, vaginal discharge and vaginal pain.   Musculoskeletal: Positive for back pain and gait problem.   Skin: Negative for rash.   Neurological: Negative for dizziness and headaches.   Hematological: Does not bruise/bleed easily.   Psychiatric/Behavioral: Negative for agitation, sleep disturbance and suicidal ideas.       /80 (BP Location: Right arm, Patient Position: Sitting, BP Method: Manual)  Pulse 86  Temp 97.8 °F (36.6 °C) (Oral)   Ht 5' 3" (1.6 m)  Wt 94.2 kg (207 lb 10.8 oz)  LMP 03/03/1978 (Within Months)  SpO2 96%  BMI 36.79 kg/m2    Physical Exam   Constitutional: She is oriented to person, place, and time. She appears well-developed and well-nourished.   HENT:   Head: Normocephalic and atraumatic.   Right Ear: External ear normal.   Left " Ear: External ear normal.   Nose: Nose normal.   Mouth/Throat: Oropharynx is clear and moist.   Eyes: Conjunctivae and EOM are normal. Pupils are equal, round, and reactive to light.   Neck: Normal range of motion. No JVD present. No thyromegaly present.   Cardiovascular: Normal rate, regular rhythm and normal heart sounds.    Pulmonary/Chest: Effort normal and breath sounds normal. She has no wheezes.   Abdominal: Soft. Bowel sounds are normal. She exhibits no distension. There is no tenderness.   Musculoskeletal: Normal range of motion.   Lymphadenopathy:     She has no cervical adenopathy.   Neurological: She is alert and oriented to person, place, and time. She has normal reflexes.   Skin: Skin is warm and dry.   Psychiatric: She has a normal mood and affect. Her behavior is normal. Judgment and thought content normal.   Vitals reviewed.    Intermediate Joint Aspiration/Injection  Date/Time: 3/24/2017 2:57 PM  Performed by: TERRANCE CASH.  Authorized by: TERRANCE CASH.     Consent Done?:  Yes (Verbal)  Indications:  Pain  Site marked: The procedure site was marked    Timeout: Prior to procedure the correct patient, procedure, and site was verified      Location:  Shoulder  Prep: Patient was prepped and draped in usual sterile fashion    Needle size:  22 G  Approach:  Anterolateral  Medications:  40 mg triamcinolone acetonide 40 mg/mL; 40 mg triamcinolone acetonide 40 mg/mL  Aspirate amount (ml):  0  Patient tolerance:  Patient tolerated the procedure well with no immediate complications        Health Maintenance  Health Maintenance Topics with due status: Not Due       Topic Last Completion Date    Colonoscopy 02/05/2014    TETANUS VACCINE 04/29/2015    Lipid Panel 04/27/2016    Eye Exam 02/23/2017    Foot Exam 04/03/2017       Assessment/Plan:  Vickie Hoskins is a 66 y.o. female who presents today for :    Vickie was seen today for back pain and shoulder pain.    Diagnoses and all orders for  this visit:    Diabetes mellitus with background retinopathy  -     HUMULIN R U-500, CONC, KWIKPEN 500 unit/mL (3 mL) InPn; Place 100 Units onto the skin 2 (two) times daily.  -  Increase in insulin     Acquired hypothyroidism  -  Pt is currently stable on medication regimen.  Continue current therapy as scheduled.  Contact office with any questions about adjustments on medications.     Moderate COPD (chronic obstructive pulmonary disease)  -  Patient is stable.  Assess and addressed all modifiable risk factors.  Continue with appropriate management to prevent complications.    Essential hypertension  -  Pt is currently stable on medication regimen.  Continue current therapy as scheduled.  Contact office with any questions about adjustments on medications.     Major depressive disorder, recurrent, severe without psychotic features  -     DbuPROPion (WELLBUTRIN XL) 150 MG TB24 tablet; Take 1 tablet (150 mg total) by mouth once daily.  -     buPROPion (WELLBUTRIN XL) 150 MG TB24 tablet; Take 1 tablet (150 mg total) by mouth once daily.    Chronic bilateral low back pain without sciatica  -     Ambulatory Referral to Back & Spine Clinic  -   Will need referral to back and spine at this time     Right shoulder injury, initial encounter  -     Intermediate Joint Aspiration/Injection  -     triamcinolone acetonide injection 40 mg; Inject 40 mg into the articular space.  -     triamcinolone acetonide injection 40 mg; Inject 40 mg into the articular space.      No Follow-up on file.

## 2017-03-24 NOTE — PROCEDURES
Intermediate Joint Aspiration/Injection  Date/Time: 3/24/2017 2:57 PM  Performed by: TERRANCE CASH  Authorized by: TERRANCE CASH     Consent Done?:  Yes (Verbal)  Indications:  Pain  Site marked: The procedure site was marked    Timeout: Prior to procedure the correct patient, procedure, and site was verified      Location:  Shoulder  Prep: Patient was prepped and draped in usual sterile fashion    Needle size:  22 G  Approach:  Anterolateral  Medications:  40 mg triamcinolone acetonide 40 mg/mL; 40 mg triamcinolone acetonide 40 mg/mL  Aspirate amount (ml):  0  Patient tolerance:  Patient tolerated the procedure well with no immediate complications

## 2017-03-31 DIAGNOSIS — J20.9 ACUTE BRONCHITIS, UNSPECIFIED ORGANISM: ICD-10-CM

## 2017-03-31 RX ORDER — ALBUTEROL SULFATE 90 UG/1
AEROSOL, METERED RESPIRATORY (INHALATION)
Qty: 18 G | Refills: 5 | Status: SHIPPED | OUTPATIENT
Start: 2017-03-31

## 2017-04-03 ENCOUNTER — OFFICE VISIT (OUTPATIENT)
Dept: PODIATRY | Facility: CLINIC | Age: 67
End: 2017-04-03
Payer: MEDICARE

## 2017-04-03 VITALS
BODY MASS INDEX: 36.68 KG/M2 | DIASTOLIC BLOOD PRESSURE: 68 MMHG | HEIGHT: 63 IN | SYSTOLIC BLOOD PRESSURE: 124 MMHG | WEIGHT: 207 LBS

## 2017-04-03 DIAGNOSIS — B35.1 ONYCHOMYCOSIS DUE TO DERMATOPHYTE: ICD-10-CM

## 2017-04-03 DIAGNOSIS — R60.0 BILATERAL LOWER EXTREMITY EDEMA: ICD-10-CM

## 2017-04-03 DIAGNOSIS — R20.8 BURNING SENSATION OF TOE AND FOOT: ICD-10-CM

## 2017-04-03 DIAGNOSIS — R20.2 PARESTHESIAS: ICD-10-CM

## 2017-04-03 DIAGNOSIS — E11.49 TYPE II DIABETES MELLITUS WITH NEUROLOGICAL MANIFESTATIONS: Primary | ICD-10-CM

## 2017-04-03 PROCEDURE — 1157F ADVNC CARE PLAN IN RCRD: CPT | Mod: S$GLB,,, | Performed by: PODIATRIST

## 2017-04-03 PROCEDURE — 11721 DEBRIDE NAIL 6 OR MORE: CPT | Mod: Q9,S$GLB,, | Performed by: PODIATRIST

## 2017-04-03 PROCEDURE — 3078F DIAST BP <80 MM HG: CPT | Mod: S$GLB,,, | Performed by: PODIATRIST

## 2017-04-03 PROCEDURE — 99499 UNLISTED E&M SERVICE: CPT | Mod: S$GLB,,, | Performed by: PODIATRIST

## 2017-04-03 PROCEDURE — 3044F HG A1C LEVEL LT 7.0%: CPT | Mod: S$GLB,,, | Performed by: PODIATRIST

## 2017-04-03 PROCEDURE — 1160F RVW MEDS BY RX/DR IN RCRD: CPT | Mod: S$GLB,,, | Performed by: PODIATRIST

## 2017-04-03 PROCEDURE — 1126F AMNT PAIN NOTED NONE PRSNT: CPT | Mod: S$GLB,,, | Performed by: PODIATRIST

## 2017-04-03 PROCEDURE — 4010F ACE/ARB THERAPY RXD/TAKEN: CPT | Mod: S$GLB,,, | Performed by: PODIATRIST

## 2017-04-03 PROCEDURE — 99214 OFFICE O/P EST MOD 30 MIN: CPT | Mod: 25,S$GLB,, | Performed by: PODIATRIST

## 2017-04-03 PROCEDURE — 3074F SYST BP LT 130 MM HG: CPT | Mod: S$GLB,,, | Performed by: PODIATRIST

## 2017-04-03 PROCEDURE — 1159F MED LIST DOCD IN RCRD: CPT | Mod: S$GLB,,, | Performed by: PODIATRIST

## 2017-04-03 PROCEDURE — 99999 PR PBB SHADOW E&M-EST. PATIENT-LVL III: CPT | Mod: PBBFAC,,, | Performed by: PODIATRIST

## 2017-04-03 RX ORDER — GABAPENTIN 300 MG/1
300 CAPSULE ORAL NIGHTLY
Qty: 30 CAPSULE | Refills: 3 | Status: SHIPPED | OUTPATIENT
Start: 2017-04-03 | End: 2017-05-30 | Stop reason: SDUPTHER

## 2017-04-03 NOTE — PATIENT INSTRUCTIONS
Recommend lotions: eucerin, aquaphor, A&D ointment, gold bond for diabetics        Shoe recommendations: (try 6pm.com, zappos.com , [email or web address][employer], or shoes.com for discounted prices) you can visit DSW shoes in [place] as well    Asics (GT 1000 or gel foundations), new balance, saucony (stabil c3),  Mcmullen (transcend), vionic, propet (tennis shoe)    soft brand, clarks, crocs, aerosoles, naturalizers, SAS, ecco, kimberly, daren sunshine, rockports (dress shoes)    Vionic, volitiles, burkenstocks, fitflops, propet (sandals)    Nike comfort thong sandals, crocs (house shoes)      Nail Home remedy:  Vicks Vapor rub to cuticles  Listerine and apple cider vinegar in a spray bottle to nails       occasional soaks for 15-20 mins in luke warm water with 1 cup of listerine and 1 cup of apple cider vinegar is ok          Diabetes: Inspecting Your Feet    Diabetes increases your chances of developing foot problems. So inspect your feet every day. This helps you find small skin irritations before they become serious ulcers or infections. If you have trouble seeing the bottoms of your feet, use a mirror or ask a family member or friend to help.  How to check your feet  Below are tips to help you look for foot problems. Try to check your feet at the same time each day, such as when you get out of bed in the morning:  · Check the top of each foot. The tops of toes, back of the heel, and outer edge of the foot can get a lot of rubbing from poor-fitting shoes.  · Check the bottom of each foot. Daily wear and tear often leads to problems at pressure spots.  · Check the toes and nails. Fungal infections often occur between toes. Toenail problems can also be a sign of fungal infections or lead to breaks in the skin.  · Check your shoes, too. Loose objects inside a shoe can injure the foot. Use your hand to feel inside your shoes for things like charo, loose stitching, or rough areas that could irritate your skin.  Warning signs  Look for  any color changes in the foot. Redness with streaks can signal a severe infection, which needs immediate medical attention. Tell your healthcare provider right away if you have any of these problems:  · Swelling, sometimes with color changes, may be a sign of poor blood flow or infection. Symptoms include tenderness and an increase in the size of your foot.  · Warm or hot areas on your feet may be signs of infection. A foot that is cold may not be getting enough blood.  · Sensations such as burning, tingling, or pins and needles can be signs of a problem. Also check for areas that may be numb.  · Hot spots are caused by friction or pressure. Look for hot spots in areas that get a lot of rubbing. Hot spots can turn into blisters, calluses, or sores.  · Cracks and sores are caused by dry or irritated skin. They are a sign that the skin is breaking down, which can lead to infection.  · Toenail problems to watch for include nails growing into the skin (ingrown toenail) and causing redness or pain. Thick, yellow, or discolored nails can signal a fungal infection.  · Drainage and odor can develop from untreated sores and ulcers. Call your healthcare provider right away if you notice white or yellow drainage, bleeding, or unpleasant odor.   Date Last Reviewed: 6/1/2016 © 2000-2016 [employer]. [address] [place] [address], [place] [address]. All rights reserved. This information is not intended as a substitute for professional medical care. Always follow your healthcare professional's instructions.

## 2017-04-03 NOTE — LETTER
April 3, 2017      Dashawn Roman Jr., NP  [address]  [place] [address]           [facility]  [address] [facility]  [place] [address]  Phone: [phone]          Patient: Vickie Hoskins   MR Number: [id number]   YOB: 1950   Date of Visit: 4/3/2017       Dear Dashawn Roman Jr.:    Thank you for referring Vickie Hoskins to me for evaluation. Attached you will find relevant portions of my assessment and plan of care.    If you have questions, please do not hesitate to call me. I look forward to following Vickie Hoskins along with you.    Sincerely,    Brenda Blakely DPM    Enclosure  CC:  No Recipients    If you would like to receive this communication electronically, please contact [email or web address] or [phone] to request more information on [employer] access.    For providers and/or their staff who would like to refer a patient to [facility], please contact us through our one-stop-shop provider referral line, [facility], at [phone].    If you feel you have received this communication in error or would no longer like to receive these types of communications, please e-mail [email or web address]

## 2017-04-03 NOTE — PROGRESS NOTES
Subjective:      Patient ID: Vickie Hoskins is a 66 y.o. female.    Chief Complaint: Diabetes Mellitus (pcp Dr. Graff ); Diabetic Foot Exam; Nail Care; and Foot Problem (itching, burning, nump )    Vickie is a 66 y.o. female who presents to the clinic for evaluation and treatment of high risk feet. Vickie has a past medical history of Amblyopia; Anemia; Anxiety; Arthritis; Cataract; Depression; Diabetes mellitus, type 2; Diabetes with neurologic complications; Diabetic retinopathy; Glaucoma; Hyperlipidemia; Hypertension; Hypothyroidism; Insomnia; Moderate COPD (chronic obstructive pulmonary disease) (6/29/2016); NAFLD (nonalcoholic fatty liver disease); Obesity (BMI 30.0-34.9); Sleep apnea; Strabismus; Thyroid disease; Tobacco dependence; and Urinary incontinence. The patient's chief complaint is long, thick toenails and burning pain to tips of toes of both feet. Worse at night. Has not tried to self treat. Previously was prescribed Gabapentin 100mg three times daily but did not take. Inquiring about further treatment options. Worried it may be athletes foot. Denies any wounds or rashes to both feet.  This patient has documented high risk feet requiring routine maintenance secondary to diabetes mellitis and those secondary complications of diabetes, as mentioned..    PCP: Pari Graff MD    Date Last Seen by PCP: 3-24-17  Chief Complaint   Patient presents with    Diabetes Mellitus     pcp Dr. Graff     Diabetic Foot Exam    Nail Care    Foot Problem     itching, burning, nump          Current shoe gear:  Casual shoes         Hemoglobin A1C   Date Value Ref Range Status   10/11/2016 6.9 (H) 4.5 - 6.2 % Final     Comment:     According to ADA guidelines, hemoglobin A1C <7.0% represents  optimal control in non-pregnant diabetic patients.  Different  metrics may apply to specific populations.   Standards of Medical Care in Diabetes - 2016.  For the purpose of screening for the presence of  diabetes:  <5.7%     Consistent with the absence of diabetes  5.7-6.4%  Consistent with increasing risk for diabetes   (prediabetes)  >or=6.5%  Consistent with diabetes  Currently no consensus exists for use of hemoglobin A1C  for diagnosis of diabetes for children.     04/08/2016 10.2 (H) 4.5 - 6.2 % Final   11/03/2015 9.3 (H) 4.5 - 6.2 % Final     Past Medical History:   Diagnosis Date    Amblyopia     Anemia     Anxiety     Arthritis     Cataract     Depression     Diabetes mellitus, type 2     Diabetes with neurologic complications     Diabetic retinopathy     Glaucoma     Hyperlipidemia     Hypertension     Hypothyroidism     Insomnia     Moderate COPD (chronic obstructive pulmonary disease) 6/29/2016    NAFLD (nonalcoholic fatty liver disease)     Obesity (BMI 30.0-34.9)     Sleep apnea     Has a C-pap device, but does not use it.    Strabismus     Thyroid disease     nodules    Tobacco dependence     resolved 1985    Urinary incontinence        Past Surgical History:   Procedure Laterality Date    APPENDECTOMY  1978 approx    incidental     CATARACT EXTRACTION Bilateral     CHOLECYSTECTOMY  1975 approx    EYE SURGERY Bilateral 2012    cataract w/IOL    FOOT SURGERY Left 10/23/15    left hindfoot calcanectomy and bursectomy    HYSTERECTOMY  1978    SOLANGE     STRABISMUS SURGERY Left at age 1        Family History   Problem Relation Age of Onset    Cancer Mother      unsure what kind    Heart disease Father     Hypertension Father     Alcohol abuse Father     Cancer Maternal Aunt      bone cancer    Diabetes Maternal Aunt     Hypertension Sister     Alcohol abuse Sister     Hypertension Son     Alcohol abuse Son      DWI    Hypertension Son     Alcohol abuse Son      DWI    Hypertension Paternal Uncle     Heart disease Paternal Uncle     Hypertension Maternal Grandmother     Heart disease Maternal Grandmother     Cancer Maternal Aunt     Mental illness Cousin     No  Known Problems Brother     No Known Problems Maternal Uncle     No Known Problems Paternal Aunt     No Known Problems Maternal Grandfather     No Known Problems Paternal Grandmother     No Known Problems Paternal Grandfather     Stroke Neg Hx     Mental retardation Neg Hx     Drug abuse Neg Hx     Amblyopia Neg Hx     Blindness Neg Hx     Cataracts Neg Hx     Glaucoma Neg Hx     Macular degeneration Neg Hx     Retinal detachment Neg Hx     Strabismus Neg Hx     Thyroid disease Neg Hx        Social History     Social History    Marital status:      Spouse name: N/A    Number of children: N/A    Years of education: N/A     Social History Main Topics    Smoking status: Former Smoker     Packs/day: 1.00     Years: 30.00     Types: Cigarettes     Quit date: 1985    Smokeless tobacco: Never Used    Alcohol use 0.0 oz/week     0 Standard drinks or equivalent per week      Comment: maybe once a year    Drug use: No    Sexual activity: Not Currently     Partners: Male     Other Topics Concern    None     Social History Narrative    Pt's  is  since , currently lives with her sister and brother in law. She has 3 children ( One son with HTN but otherwise in good health)Sons live in [facility] [place], and daughter in [place], [place]. Patient is retired from childcare work. She still drives a car. Coffee intake 1-2 cups a day. She does not have Living Will or Advanced Directive.        Current Outpatient Prescriptions   Medication Sig Dispense Refill    amlodipine-valsartan-hcthiazid -25 mg Tab Take by mouth once daily.      ascorbic acid (VITAMIN C) 500 MG tablet Take 500 mg by mouth once daily.      aspirin (ECOTRIN) 81 MG EC tablet Take 81 mg by mouth once daily.      BIOTIN ORAL Take by mouth once daily.      blood sugar diagnostic (ACCU-CHEK BASILIA PLUS TEST STRP) Strp 1 strip by Misc.(Non-Drug; Combo Route) route 3 (three) times daily. Accu-chek Basilia Plus  "Strips 100 strip 11    blood-glucose meter (ACCU-CHEK BASILIA PLUS METER) Misc 1 each by Misc.(Non-Drug; Combo Route) route as directed. Accu-chek Basilia Plus Meter 1 each 0    budesonide-formoterol 80-4.5 mcg (SYMBICORT) 80-4.5 mcg/actuation HFAA Inhale 2 puffs into the lungs 2 (two) times daily. 1 Inhaler 11    buPROPion (WELLBUTRIN XL) 150 MG TB24 tablet Take 1 tablet (150 mg total) by mouth once daily. 90 tablet 1    canagliflozin (INVOKANA) 100 mg Tab Take 1 tablet (100 mg total) by mouth once daily. 90 tablet 3    carvedilol (COREG) 25 MG tablet Take 1 tablet (25 mg total) by mouth 2 (two) times daily with meals. 180 tablet 3    duloxetine (CYMBALTA) 60 MG capsule Take 1 capsule (60 mg total) by mouth once daily. 90 capsule 0    fish oil-omega-3 fatty acids 300-1,000 mg capsule Take 2 g by mouth once daily.      fluticasone (FLONASE) 50 mcg/actuation nasal spray 2 sprays by Each Nare route once daily. 16 g 11    HUMULIN R U-500, CONC, KWIKPEN 500 unit/mL (3 mL) InPn Place 100 Units onto the skin 2 (two) times daily. 24 mL 0    ketoconazole (NIZORAL) 2 % cream Apply topically once daily. 60 g 0    levothyroxine (SYNTHROID) 75 MCG tablet Take 1 tablet (75 mcg total) by mouth once daily. 90 tablet 3    meclizine (ANTIVERT) 25 mg tablet Take 1 tablet (25 mg total) by mouth 3 (three) times daily as needed. 180 tablet 3    meclizine (ANTIVERT) 25 mg tablet Take 1 tablet (25 mg total) by mouth 3 (three) times daily as needed. 30 tablet 0    metformin (GLUCOPHAGE) 500 MG tablet Take 2 tablets (1,000 mg total) by mouth 2 (two) times daily with meals. 360 tablet 2    multivitamin (ONE DAILY MULTIVITAMIN) per tablet Take 1 tablet by mouth once daily.      NOVOTWIST 32 gauge x 1/5" Ndle USE AS DIRECTED TWICE A  each 10    omeprazole (PRILOSEC) 40 MG capsule Take 1 capsule (40 mg total) by mouth once daily. 90 capsule 3    pen needle, diabetic (SURE COMFORT PEN NEEDLE) 31 gauge x 3/16" Ndle Inject 1 " "Stick into the skin 2 (two) times daily. 200 each 3    PSYLLIUM SEED, WITH DEXTROSE, (FIBER ORAL) Take 5 g by mouth once daily.      spironolactone (ALDACTONE) 25 MG tablet Take 1 tablet (25mg total) by mouth every morning for 1 week.  If well tolerated, increase to 2 tablets (50mg total) by mouth every morning. 180 tablet 3    sumatriptan (IMITREX) 100 MG tablet Take 1 tablet (100 mg total) by mouth every 2 (two) hours as needed. (Patient taking differently: Take 100 mg by mouth every 2 (two) hours as needed. ) 9 tablet 4    trazodone (DESYREL) 100 MG tablet 2 tabs oral before going to bed 180 tablet 3    VENTOLIN HFA 90 mcg/actuation inhaler INHALE 2 PUFFS INTO THE LUNGS EVERY 6 (SIX) HOURS AS NEEDED FOR WHEEZING. 18 g 5    gabapentin (NEURONTIN) 300 MG capsule Take 1 capsule (300 mg total) by mouth every evening. 30 capsule 3    insulin syringe-needle U-100 0.3 mL 31 x 15/64" Syrg 1 Stick by Misc.(Non-Drug; Combo Route) route 2 (two) times daily. 100 Syringe 0     No current facility-administered medications for this visit.      Facility-Administered Medications Ordered in Other Visits   Medication Dose Route Frequency Provider Last Rate Last Dose    triamcinolone acetonide injection 40 mg  40 mg Intra-articular  Pari Graff MD   40 mg at 03/24/17 1457    triamcinolone acetonide injection 40 mg  40 mg Intra-articular  Pari Graff MD   40 mg at 03/24/17 1457       Review of patient's allergies indicates:   Allergen Reactions    Pcn [penicillins] Other (See Comments)     Pt was told by mother that she was allergic to PCN.    Silver nitrate Other (See Comments)     Remained on skin more than week       Review of Systems   Constitution: Negative for chills and fever.   Cardiovascular: Negative for chest pain, claudication and leg swelling.   Respiratory: Negative for cough and shortness of breath.    Skin: Positive for dry skin and nail changes.   Musculoskeletal: Negative.  "   Gastrointestinal: Negative for nausea and vomiting.   Neurological: Positive for numbness, paresthesias and sensory change.   Psychiatric/Behavioral: Negative for altered mental status.           Objective:      Physical Exam   Constitutional: She is oriented to person, place, and time. She appears well-developed and well-nourished.   HENT:   Head: Normocephalic.   Cardiovascular: Intact distal pulses.    Pulses:       Dorsalis pedis pulses are 2+ on the right side, and 2+ on the left side.        Posterior tibial pulses are 1+ on the right side, and 1+ on the left side.   CRT < 3 sec to tips of toes. Mild edema noted to b/l LE. Mild vericosities noted to b/l LEs.      Pulmonary/Chest: No respiratory distress.   Musculoskeletal:   Gastrocnemius equinus noted to b/l ankles with decreased DF noted on exam. MMT 5/5 in DF/PF/Inv/Ev resistance with no reproduction of pain in any direction. Passive range of motion of ankle and pedal joints is painless. Adequate pedal joint ROM.      Neurological: She is alert and oriented to person, place, and time. She has normal strength. A sensory deficit is present.   Light touch, proprioception, and sharp/dull sensation are all intact bilaterally. Protective threshold with the [place]-Wienstein monofilament is intact bilaterally. Vibratory sensation diminished b/l distal foot. Subjective paresthesias with no clearly identifiable source or trigger.      Skin: Skin is warm, dry and intact. No erythema.   No open lesions, lacerations or wounds noted. Nails are thickened, elongated, discolored yellow/brown with subungual debris and brittleness to R 1-5 and L 1-5. Interdigital spaces clean, dry and intact b/l. No erythema noted to b/l foot. Skin texture thin, dry, atrophic. Pedal hair absent. Skin temperature normal b/l foot.      Psychiatric: She has a normal mood and affect. Her behavior is normal. Judgment and thought content normal.   Vitals reviewed.            Assessment:        Encounter Diagnoses   Name Primary?    Type II diabetes mellitus with neurological manifestations Yes    Paresthesias     Bilateral lower extremity edema     Onychomycosis due to dermatophyte     Burning sensation of toe and foot          Plan:       Vickie was seen today for diabetes mellitus, diabetic foot exam, nail care and foot problem.    Diagnoses and all orders for this visit:    Type II diabetes mellitus with neurological manifestations    Paresthesias    Bilateral lower extremity edema    Onychomycosis due to dermatophyte    Burning sensation of toe and foot    Other orders  -     gabapentin (NEURONTIN) 300 MG capsule; Take 1 capsule (300 mg total) by mouth every evening.      I counseled the patient on her conditions, their implications and medical management.        - Shoe inspection. Diabetic Foot Education. Patient reminded of the importance of good nutrition and blood sugar control to help prevent podiatric complications of diabetes. Patient instructed on proper foot hygeine. We discussed wearing proper shoe gear, daily foot inspections, never walking without protective shoe gear, never putting sharp instruments to feet, routine podiatric nail visits every 3-4 months.      - With patient's permission, nails were aggressively reduced and debrided x 10 to their soft tissue attachment mechanically and with electric , removing all offending nail and debris. Patient relates relief following the procedure. She will continue to monitor the areas daily, inspect her feet, wear protective shoe gear when ambulatory, moisturizer to maintain skin integrity and follow in this office in approximately 3-4 months, sooner p.r.n.    - Discussed regular and routine moisturizer to skin of both feet to help improve dry skin. Advised to apply twice daily until resolution of symptoms. Avoid between toes.     - Gabapentin 300mg nightly for nerve pain.     - RTC 3 months, sooner PRN

## 2017-04-03 NOTE — MR AVS SNAPSHOT
[facility]  [address] [facility]  [place] [address]  Phone: [phone]                  Vickie Hoskins   4/3/2017 1:15 PM   Office Visit    Description:  Female : 1950   Provider:  Brenda Blakely DPM   Department:  [facility]           Reason for Visit     Diabetes Mellitus     Diabetic Foot Exam     Nail Care     Foot Problem           Diagnoses this Visit        Comments    Type II diabetes mellitus with neurological manifestations    -  Primary     Paresthesias         Bilateral lower extremity edema         Onychomycosis due to dermatophyte                To Do List           Future Appointments        Provider Department Dept Phone    7/3/2017 1:00 PM Brenda Blakely DPM [facility] [phone]      Goals (5 Years of Data)              Today    4/2/17    3/30/17    Blood Pressure <= 140/90   124/68  124/68  124/68    Notes - Note created  2016  2:50 PM by Macey Goel    It is important to consistently maintain a controlled blood pressure.      Exercise at least 150 minutes per week.           Maintain a low sodium diet           Take at least one BP reading per week at various times of the day           Weight (lb) < 85.7 kg (189 lb)   93.9 kg (207 lb)        Notes - Note created  2016  4:27 PM by Macey Goel    Decrease weight by 5% to reduce cardiovascular risk factors        [facility]     [facility] -  Assistance  Unless otherwise directed by your provider, please contact [facility], our nurse care line that is available for  assistance.     Registered nurses in the [facility] provide: appointment scheduling, clinical advisement, health education, and other advisory services.  Call: [phone] (toll free)               Medications           Message regarding Medications     Verify the changes and/or additions to your medication regime listed below are the same as discussed with your clinician today.   If any of these changes or additions are incorrect, please notify your healthcare provider.        STOP taking these medications     gabapentin (NEURONTIN) 100 MG capsule TAKE 1 CAPSULE THREE TIMES DAILY           Verify that the below list of medications is an accurate representation of the medications you are currently taking.  If none reported, the list may be blank. If incorrect, please contact your healthcare provider. Carry this list with you in case of emergency.           Current Medications     amlodipine-valsartan-hcthiazid -25 mg Tab Take by mouth once daily.    ascorbic acid (VITAMIN C) 500 MG tablet Take 500 mg by mouth once daily.    aspirin (ECOTRIN) 81 MG EC tablet Take 81 mg by mouth once daily.    BIOTIN ORAL Take by mouth once daily.    blood sugar diagnostic (ACCU-CHEK BASILIA PLUS TEST STRP) Strp 1 strip by Misc.(Non-Drug; Combo Route) route 3 (three) times daily. Accu-chek Basilia Plus Strips    blood-glucose meter (ACCU-CHEK BASILIA PLUS METER) Misc 1 each by Misc.(Non-Drug; Combo Route) route as directed. Accu-chek Basilia Plus Meter    budesonide-formoterol 80-4.5 mcg (SYMBICORT) 80-4.5 mcg/actuation HFAA Inhale 2 puffs into the lungs 2 (two) times daily.    buPROPion (WELLBUTRIN XL) 150 MG TB24 tablet Take 1 tablet (150 mg total) by mouth once daily.    canagliflozin (INVOKANA) 100 mg Tab Take 1 tablet (100 mg total) by mouth once daily.    carvedilol (COREG) 25 MG tablet Take 1 tablet (25 mg total) by mouth 2 (two) times daily with meals.    duloxetine (CYMBALTA) 60 MG capsule Take 1 capsule (60 mg total) by mouth once daily.    fish oil-omega-3 fatty acids 300-1,000 mg capsule Take 2 g by mouth once daily.    fluticasone (FLONASE) 50 mcg/actuation nasal spray 2 sprays by Each Nare route once daily.    HUMULIN R U-500, CONC, KWIKPEN 500 unit/mL (3 mL) InPn Place 100 Units onto the skin 2 (two) times daily.    ketoconazole (NIZORAL) 2 % cream Apply topically once daily.    levothyroxine  "(SYNTHROID) 75 MCG tablet Take 1 tablet (75 mcg total) by mouth once daily.    meclizine (ANTIVERT) 25 mg tablet Take 1 tablet (25 mg total) by mouth 3 (three) times daily as needed.    meclizine (ANTIVERT) 25 mg tablet Take 1 tablet (25 mg total) by mouth 3 (three) times daily as needed.    metformin (GLUCOPHAGE) 500 MG tablet Take 2 tablets (1,000 mg total) by mouth 2 (two) times daily with meals.    multivitamin (ONE DAILY MULTIVITAMIN) per tablet Take 1 tablet by mouth once daily.    NOVOTWIST 32 gauge x 1/5" Ndle USE AS DIRECTED TWICE A DAY    omeprazole (PRILOSEC) 40 MG capsule Take 1 capsule (40 mg total) by mouth once daily.    pen needle, diabetic (SURE COMFORT PEN NEEDLE) 31 gauge x 3/16" Ndle Inject 1 Stick into the skin 2 (two) times daily.    PSYLLIUM SEED, WITH DEXTROSE, (FIBER ORAL) Take 5 g by mouth once daily.    spironolactone (ALDACTONE) 25 MG tablet Take 1 tablet (25mg total) by mouth every morning for 1 week.  If well tolerated, increase to 2 tablets (50mg total) by mouth every morning.    sumatriptan (IMITREX) 100 MG tablet Take 1 tablet (100 mg total) by mouth every 2 (two) hours as needed.    trazodone (DESYREL) 100 MG tablet 2 tabs oral before going to bed    VENTOLIN HFA 90 mcg/actuation inhaler INHALE 2 PUFFS INTO THE LUNGS EVERY 6 (SIX) HOURS AS NEEDED FOR WHEEZING.    insulin syringe-needle U-100 0.3 mL 31 x 15/64" Syrg 1 Stick by Misc.(Non-Drug; Combo Route) route 2 (two) times daily.           Clinical Reference Information           Your Vitals Were     BP Height Weight Last Period BMI    124/68 5' 3" (1.6 m) 93.9 kg (207 lb) 03/03/1978 (Within Months) 36.67 kg/m2      Blood Pressure          Most Recent Value    BP  124/68      Allergies as of 4/3/2017     Pcn [Penicillins]    Silver Nitrate      Immunizations Administered on Date of Encounter - 4/3/2017     None      Instructions    Recommend lotions: eucerin, aquaphor, A&D ointment, gold bond for diabetics        Shoe " recommendations: (try 6pm.com, zappos.com , nordstromrack.com, or shoes.com for discounted prices) you can visit DSW shoes in [place] as well    Asics (GT 1000 or gel foundations), new balance, saucony (stabil c3),  Mcmullen (transcend), vionic, propet (tennis shoe)    soft brand, clarks, crocs, aerosoles, naturalizers, SAS, ecco, kimberly, daren sunshine, rockports (dress shoes)    Vionic, volitiles, burkenstocks, fitflops, propet (sandals)    Nike comfort thong sandals, crocs (house shoes)      Nail Home remedy:  Vicks Vapor rub to cuticles  Listerine and apple cider vinegar in a spray bottle to nails       occasional soaks for 15-20 mins in luke warm water with 1 cup of listerine and 1 cup of apple cider vinegar is ok          Diabetes: Inspecting Your Feet    Diabetes increases your chances of developing foot problems. So inspect your feet every day. This helps you find small skin irritations before they become serious ulcers or infections. If you have trouble seeing the bottoms of your feet, use a mirror or ask a family member or friend to help.  How to check your feet  Below are tips to help you look for foot problems. Try to check your feet at the same time each day, such as when you get out of bed in the morning:  · Check the top of each foot. The tops of toes, back of the heel, and outer edge of the foot can get a lot of rubbing from poor-fitting shoes.  · Check the bottom of each foot. Daily wear and tear often leads to problems at pressure spots.  · Check the toes and nails. Fungal infections often occur between toes. Toenail problems can also be a sign of fungal infections or lead to breaks in the skin.  · Check your shoes, too. Loose objects inside a shoe can injure the foot. Use your hand to feel inside your shoes for things like charo, loose stitching, or rough areas that could irritate your skin.  Warning signs  Look for any color changes in the foot. Redness with streaks can signal a severe infection, which  needs immediate medical attention. Tell your healthcare provider right away if you have any of these problems:  · Swelling, sometimes with color changes, may be a sign of poor blood flow or infection. Symptoms include tenderness and an increase in the size of your foot.  · Warm or hot areas on your feet may be signs of infection. A foot that is cold may not be getting enough blood.  · Sensations such as burning, tingling, or pins and needles can be signs of a problem. Also check for areas that may be numb.  · Hot spots are caused by friction or pressure. Look for hot spots in areas that get a lot of rubbing. Hot spots can turn into blisters, calluses, or sores.  · Cracks and sores are caused by dry or irritated skin. They are a sign that the skin is breaking down, which can lead to infection.  · Toenail problems to watch for include nails growing into the skin (ingrown toenail) and causing redness or pain. Thick, yellow, or discolored nails can signal a fungal infection.  · Drainage and odor can develop from untreated sores and ulcers. Call your healthcare provider right away if you notice white or yellow drainage, bleeding, or unpleasant odor.   Date Last Reviewed: 6/1/2016  © 4796-1290 [employer]. [address] [place] [address], [place] [address]. All rights reserved. This information is not intended as a substitute for professional medical care. Always follow your healthcare professional's instructions.             Language Assistance Services     ATTENTION: Language assistance services are available, free of charge. Please call [phone].      ATENCIÓN: Si habla español, tiene a colunga disposición servicios gratuitos de asistencia lingüística. Llame al [phone].     [facility] Ý: N?u b?n nói Ti?ng Vi?t, có các d?ch v? h? tr? ngôn ng? mi?n phí dành cho b?n. G?i s? [phone].         [facility] complies with applicable Federal civil rights laws and does not discriminate on the basis of race,  color, national origin, age, disability, or sex.

## 2017-04-07 ENCOUNTER — PATIENT OUTREACH (OUTPATIENT)
Dept: OTHER | Facility: OTHER | Age: 67
End: 2017-04-07
Payer: MEDICARE

## 2017-04-07 NOTE — PROGRESS NOTES
Last 5 Patient Entered Readings                                                               Current 30 Day Average: 136/75     Recent Readings 4/5/2017 4/2/2017 3/30/2017 3/28/2017 3/27/2017    Systolic BP (mmHg) 128 132 148 144 140    Diastolic BP (mmHg) 77 72 76 78 68    Pulse 82 69 77 73 81        Hypertension Medications             amlodipine-valsartan-hcthiazid -25 mg Tab Take by mouth once daily.    carvedilol (COREG) 25 MG tablet Take 1 tablet (25 mg total) by mouth 2 (two) times daily with meals.    spironolactone (ALDACTONE) 25 MG tablet Take 1 tablet (25mg total) by mouth every morning for 1 week.  If well tolerated, increase to 2 tablets (50mg total) by mouth every morning.        Plan:   Called patient to follow up. Per current 30 day average, BP is well controlled. Patient denies having questions or concerns. Will continue to monitor. WCB in 3 months, sooner if BP begins to trend up or down.

## 2017-04-12 ENCOUNTER — TELEPHONE (OUTPATIENT)
Dept: FAMILY MEDICINE | Facility: CLINIC | Age: 67
End: 2017-04-12

## 2017-04-12 NOTE — TELEPHONE ENCOUNTER
Patient informed that her order was placed in March and is ready to schedule.  Appointment scheduled for 4/17/2017.

## 2017-04-12 NOTE — TELEPHONE ENCOUNTER
----- Message from Emelyn Koo sent at 4/11/2017  4:40 PM CDT -----  Contact: Self   Patient need orders for Bone Density. Please call patient at [phone].

## 2017-04-17 ENCOUNTER — HOSPITAL ENCOUNTER (OUTPATIENT)
Dept: RADIOLOGY | Facility: CLINIC | Age: 67
Discharge: HOME | End: 2017-04-17
Attending: NURSE PRACTITIONER
Payer: MEDICARE

## 2017-04-17 DIAGNOSIS — M89.9 DISORDER OF BONE: ICD-10-CM

## 2017-04-17 PROCEDURE — 77080 DXA BONE DENSITY AXIAL: CPT | Mod: 26,,, | Performed by: INTERNAL MEDICINE

## 2017-04-17 PROCEDURE — 77080 DXA BONE DENSITY AXIAL: CPT | Mod: TC,PO

## 2017-04-18 ENCOUNTER — PATIENT MESSAGE (OUTPATIENT)
Dept: FAMILY MEDICINE | Facility: CLINIC | Age: 67
End: 2017-04-18

## 2017-04-18 ENCOUNTER — TELEPHONE (OUTPATIENT)
Dept: FAMILY MEDICINE | Facility: CLINIC | Age: 67
End: 2017-04-18

## 2017-04-18 ENCOUNTER — PATIENT OUTREACH (OUTPATIENT)
Dept: OTHER | Facility: OTHER | Age: 67
End: 2017-04-18
Payer: MEDICARE

## 2017-04-18 NOTE — PROGRESS NOTES
"Last 5 Patient Entered Readings                                                               Current 30 Day Average: 140/79     Recent Readings 4/18/2017 4/17/2017 4/15/2017 4/12/2017 4/10/2017    Systolic BP (mmHg) 145 137 133 138 148    Diastolic BP (mmHg) 76 85 83 76 82    Pulse 90 90 94 85 86        Follow up with Ms. Vickie Hoskins completed. Ms. Hoskins is doing well. Patient reports that she has been doing some walking. She has not been "dieting", but she does all of her cooking at home and is choosing healthier options when cooking. Patient did not have any further questions or concerns. I will follow up in a few weeks to see how she is doing and progressing.        "

## 2017-04-19 DIAGNOSIS — F32.2 MAJOR DEPRESSIVE DISORDER, SINGLE EPISODE, SEVERE WITHOUT PSYCHOTIC FEATURES: ICD-10-CM

## 2017-04-19 RX ORDER — DULOXETIN HYDROCHLORIDE 60 MG/1
CAPSULE, DELAYED RELEASE ORAL
Qty: 90 CAPSULE | Refills: 0 | Status: SHIPPED | OUTPATIENT
Start: 2017-04-19 | End: 2017-05-30 | Stop reason: SDUPTHER

## 2017-04-19 RX ORDER — MELOXICAM 7.5 MG/1
TABLET ORAL
Qty: 180 TABLET | Refills: 0 | Status: SHIPPED | OUTPATIENT
Start: 2017-04-19 | End: 2017-05-30 | Stop reason: SDUPTHER

## 2017-04-20 ENCOUNTER — PATIENT MESSAGE (OUTPATIENT)
Dept: PODIATRY | Facility: CLINIC | Age: 67
End: 2017-04-20

## 2017-04-24 ENCOUNTER — PATIENT MESSAGE (OUTPATIENT)
Dept: FAMILY MEDICINE | Facility: CLINIC | Age: 67
End: 2017-04-24

## 2017-04-24 ENCOUNTER — TELEPHONE (OUTPATIENT)
Dept: FAMILY MEDICINE | Facility: CLINIC | Age: 67
End: 2017-04-24

## 2017-04-24 DIAGNOSIS — E11.3299 DIABETES MELLITUS WITH BACKGROUND RETINOPATHY: Chronic | ICD-10-CM

## 2017-04-24 RX ORDER — INSULIN HUMAN 500 [IU]/ML
100 INJECTION, SOLUTION SUBCUTANEOUS 2 TIMES DAILY
Qty: 72 ML | Refills: 0 | Status: SHIPPED | OUTPATIENT
Start: 2017-04-24 | End: 2017-07-23

## 2017-04-24 NOTE — TELEPHONE ENCOUNTER
----- Message from Dashawn Roman Jr. NP sent at 4/24/2017  2:16 PM CDT -----  Please inform patient of radiologist recommendations from her bone density scan: adequate calcium, vitamin D; appropriate exercise.

## 2017-05-09 ENCOUNTER — PATIENT OUTREACH (OUTPATIENT)
Dept: OTHER | Facility: OTHER | Age: 67
End: 2017-05-09
Payer: MEDICARE

## 2017-05-09 NOTE — PROGRESS NOTES
Last 5 Patient Entered Readings                                                               Current 30 Day Average: 143/80     Recent Readings 5/1/2017 4/28/2017 4/28/2017 4/26/2017 4/24/2017    Systolic BP (mmHg) 152 148 159 142 146    Diastolic BP (mmHg) 85 77 87 78 74    Pulse 88 94 97 90 85        Follow up with Ms. Vickie Hoskins completed. Ms. Hoskins is doing well. She just returned home from [place] and plans on taking a BP reading today. She will also resume following her low sodium diet and exercise routine. Patient did not have any further questions or concerns. I will follow up in a few weeks to see how she is doing and progressing.

## 2017-05-11 ENCOUNTER — PATIENT OUTREACH (OUTPATIENT)
Dept: OTHER | Facility: OTHER | Age: 67
End: 2017-05-11
Payer: MEDICARE

## 2017-05-11 DIAGNOSIS — I10 ESSENTIAL HYPERTENSION: ICD-10-CM

## 2017-05-11 RX ORDER — SPIRONOLACTONE 25 MG/1
50 TABLET ORAL EVERY MORNING
Qty: 180 TABLET | Refills: 3
Start: 2017-05-11

## 2017-05-11 NOTE — PROGRESS NOTES
Last 5 Patient Entered Readings                                                               Current 30 Day Average: 143/81     Recent Readings 5/11/2017 5/11/2017 5/10/2017 5/9/2017 5/9/2017    Systolic BP (mmHg) 154 147 135 165 165    Diastolic BP (mmHg) 82 89 80 87 89    Pulse 92 95 95 87 90        Hypertension Medications             amlodipine-valsartan-hcthiazid -25 mg Tab Take by mouth once daily.    carvedilol (COREG) 25 MG tablet Take 1 tablet (25 mg total) by mouth 2 (two) times daily with meals.    spironolactone (ALDACTONE) 25 MG tablet Take 1 tablet (25mg total) by mouth every morning for 1 week.  If well tolerated, increase to 2 tablets (50mg total) by mouth every morning.        Plan:   Called patient to follow up. Per current 30 day average, BP is slightly uncontrolled and has been trending. Instructed patient to increase spironolactone to 50mg, patient confirms understanding.  Will schedule CMP. Patient denies having questions or concerns. Will continue to monitor. WCB in 2 weeks.     Patient confirms she takes mobic 7.5mg BID, which is likely negating the effect of HCTZ.

## 2017-05-15 DIAGNOSIS — F33.2 MAJOR DEPRESSIVE DISORDER, RECURRENT, SEVERE WITHOUT PSYCHOTIC FEATURES: Chronic | ICD-10-CM

## 2017-05-15 RX ORDER — BUPROPION HYDROCHLORIDE 150 MG/1
TABLET ORAL
Qty: 30 TABLET | Refills: 4 | Status: SHIPPED | OUTPATIENT
Start: 2017-05-15 | End: 2017-07-19

## 2017-05-15 RX ORDER — METFORMIN HYDROCHLORIDE 500 MG/1
TABLET ORAL
Qty: 360 TABLET | Refills: 2 | Status: SHIPPED | OUTPATIENT
Start: 2017-05-15

## 2017-05-19 ENCOUNTER — OFFICE VISIT (OUTPATIENT)
Dept: FAMILY MEDICINE | Facility: CLINIC | Age: 67
End: 2017-05-19
Payer: MEDICARE

## 2017-05-19 VITALS
TEMPERATURE: 98 F | SYSTOLIC BLOOD PRESSURE: 134 MMHG | HEART RATE: 96 BPM | DIASTOLIC BLOOD PRESSURE: 70 MMHG | BODY MASS INDEX: 37.32 KG/M2 | OXYGEN SATURATION: 93 % | HEIGHT: 63 IN | WEIGHT: 210.63 LBS

## 2017-05-19 DIAGNOSIS — J44.9 MODERATE COPD (CHRONIC OBSTRUCTIVE PULMONARY DISEASE): ICD-10-CM

## 2017-05-19 DIAGNOSIS — E11.3299 DIABETES MELLITUS WITH BACKGROUND RETINOPATHY: Chronic | ICD-10-CM

## 2017-05-19 DIAGNOSIS — I10 ESSENTIAL HYPERTENSION: Chronic | ICD-10-CM

## 2017-05-19 DIAGNOSIS — J06.9 VIRAL URI WITH COUGH: Primary | ICD-10-CM

## 2017-05-19 PROCEDURE — 99499 UNLISTED E&M SERVICE: CPT | Mod: S$GLB,,, | Performed by: NURSE PRACTITIONER

## 2017-05-19 PROCEDURE — 3078F DIAST BP <80 MM HG: CPT | Mod: S$GLB,,, | Performed by: NURSE PRACTITIONER

## 2017-05-19 PROCEDURE — 99999 PR PBB SHADOW E&M-EST. PATIENT-LVL V: CPT | Mod: PBBFAC,,, | Performed by: NURSE PRACTITIONER

## 2017-05-19 PROCEDURE — 3045F PR MOST RECENT HEMOGLOBIN A1C LEVEL 7.0-9.0%: CPT | Mod: S$GLB,,, | Performed by: NURSE PRACTITIONER

## 2017-05-19 PROCEDURE — 1159F MED LIST DOCD IN RCRD: CPT | Mod: S$GLB,,, | Performed by: NURSE PRACTITIONER

## 2017-05-19 PROCEDURE — 1125F AMNT PAIN NOTED PAIN PRSNT: CPT | Mod: S$GLB,,, | Performed by: NURSE PRACTITIONER

## 2017-05-19 PROCEDURE — 1160F RVW MEDS BY RX/DR IN RCRD: CPT | Mod: S$GLB,,, | Performed by: NURSE PRACTITIONER

## 2017-05-19 PROCEDURE — 3075F SYST BP GE 130 - 139MM HG: CPT | Mod: S$GLB,,, | Performed by: NURSE PRACTITIONER

## 2017-05-19 PROCEDURE — 99214 OFFICE O/P EST MOD 30 MIN: CPT | Mod: S$GLB,,, | Performed by: NURSE PRACTITIONER

## 2017-05-19 PROCEDURE — 4010F ACE/ARB THERAPY RXD/TAKEN: CPT | Mod: S$GLB,,, | Performed by: NURSE PRACTITIONER

## 2017-05-19 RX ORDER — CODEINE PHOSPHATE AND GUAIFENESIN 10; 100 MG/5ML; MG/5ML
5 SOLUTION ORAL 3 TIMES DAILY PRN
Qty: 180 ML | Refills: 0 | Status: SHIPPED | OUTPATIENT
Start: 2017-05-19 | End: 2017-05-29

## 2017-05-19 NOTE — PROGRESS NOTES
Subjective:       Patient ID: Vickie Hoskins is a 66 y.o. female.    Chief Complaint: Sore Throat (1 day) and Cough (4 days)    HPI Comments: 66-year-old female presents to the clinic today with complaint of a sore throat since yesterday.  She also had a cough for the past week.  She states that her inhaler does help some with the cough.  She denies any fever, chills, sinus congestion, ear pain, abdominal pain, vomiting, or diarrhea.  She denies any cardiac chest pain, heart palpitations, shortness breath, or swelling to lower extremities.  She states she has nausea when she coughs a lot.  She has chronic headaches.  She denies any dizziness or blurred vision.  Her blood sugar this morning was 140. She has COPD and diabetes.    Sore Throat    Associated symptoms include coughing. Pertinent negatives include no abdominal pain, congestion, diarrhea, ear discharge, ear pain, headaches, neck pain, shortness of breath or vomiting.   Cough   Associated symptoms include a sore throat and wheezing. Pertinent negatives include no chest pain, chills, ear pain, fever, headaches, postnasal drip, rash, rhinorrhea or shortness of breath.     Past Medical History:   Diagnosis Date    Amblyopia     Anemia     Anxiety     Arthritis     Cataract     Depression     Diabetes mellitus, type 2     Diabetes with neurologic complications     Diabetic retinopathy     Glaucoma     Hyperlipidemia     Hypertension     Hypothyroidism     Insomnia     Moderate COPD (chronic obstructive pulmonary disease) 6/29/2016    NAFLD (nonalcoholic fatty liver disease)     Obesity (BMI 30.0-34.9)     Sleep apnea     Has a C-pap device, but does not use it.    Strabismus     Thyroid disease     nodules    Tobacco dependence     resolved 1985    Urinary incontinence      Past Surgical History:   Procedure Laterality Date    APPENDECTOMY  1978 approx    incidental     CATARACT EXTRACTION Bilateral     CHOLECYSTECTOMY  1975 approx     EYE SURGERY Bilateral 2012    cataract w/IOL    FOOT SURGERY Left 10/23/15    left hindfoot calcanectomy and bursectomy    HYST[facility]CTOMY  1978    [facility]     STRABISMUS SURGERY Left at age 1       reports that she quit smoking about 32 years ago. Her smoking use included Cigarettes. She has a 30.00 pack-year smoking history. She has never used smokeless tobacco. She reports that she drinks alcohol. She reports that she does not use illicit drugs.  Review of Systems   Constitutional: Negative for chills and fever.   HENT: Positive for sore throat. Negative for congestion, ear discharge, ear pain, postnasal drip, rhinorrhea, sinus pressure and sneezing.    Respiratory: Positive for cough and wheezing. Negative for shortness of breath.    Cardiovascular: Negative for chest pain, palpitations and leg swelling.   Gastrointestinal: Positive for nausea. Negative for abdominal pain, diarrhea and vomiting.   Musculoskeletal: Negative for gait problem, neck pain and neck stiffness.   Skin: Negative for rash.   Neurological: Negative for dizziness, light-headedness and headaches.       Objective:      Physical Exam   Constitutional: She is oriented to person, place, and time. She appears well-developed and well-nourished. No distress.   HENT:   Head: Normocephalic and atraumatic.   Right Ear: External ear normal.   Left Ear: External ear normal.   Nose: Nose normal.   Mouth/Throat: Oropharynx is clear and moist. No oropharyngeal exudate.   Eyes: Conjunctivae and EOM are normal. Pupils are equal, round, and reactive to light. Right eye exhibits no discharge. Left eye exhibits no discharge. No scleral icterus.   Neck: Normal range of motion. Neck supple.   Cardiovascular: Normal rate, regular rhythm and normal heart sounds.  Exam reveals no gallop and no friction rub.    No murmur heard.  Pulmonary/Chest: Effort normal and breath sounds normal. No respiratory distress. She has no wheezes. She has no rales.   Abdominal: Soft.  Bowel sounds are normal. There is no tenderness.   Musculoskeletal: Normal range of motion. She exhibits no edema.   Lymphadenopathy:     She has cervical adenopathy.   Neurological: She is alert and oriented to person, place, and time.   Skin: Skin is warm and dry. She is not diaphoretic.   Psychiatric: She has a normal mood and affect.       Assessment:       1. Viral URI with cough    2. Diabetes mellitus with background retinopathy    3. Essential hypertension    4. Moderate COPD (chronic obstructive pulmonary disease)        Plan:         Viral URI with cough  -     guaifenesin-codeine 100-10 mg/5 ml (TUSSI-ORGANIDIN NR)  mg/5 mL syrup; Take 5 mLs by mouth 3 (three) times daily as needed.  Dispense: 180 mL; Refill: 0  - Claritin daily for the next 7 days     Diabetes mellitus with background retinopathy  - continue current medications     Essential hypertension  - The current medical regimen is effective;  continue present plan and medications.    Moderate COPD (chronic obstructive pulmonary disease)  - The current medical regimen is effective;  continue present plan and medications.

## 2017-05-19 NOTE — MR AVS SNAPSHOT
[facility]  [address] [place] [address]  Phone: [phone]  Fax: [phone]                  Vickie Hoskins   2017 1:40 PM   Office Visit    Description:  Female : 1950   Provider:  JEAN Adam   Department:  [facility] Medicine           Reason for Visit     Sore Throat     Cough           Diagnoses this Visit        Comments    Viral URI with cough    -  Primary            To Do List           Future Appointments        Provider Department Dept Phone    2017 12:30 PM LAB, [place] [facility] [phone]    7/3/2017 1:00 PM Brenda Blakely DPM [facility] [phone]      Goals (5 Years of Data)              Today    5/15/17    5/13/17    Blood Pressure <= 140/90   134/70  134/70  134/70    Notes - Note created  2016  2:50 PM by Macey Goel    It is important to consistently maintain a controlled blood pressure.      Exercise at least 150 minutes per week.           Maintain a low sodium diet           Take at least one BP reading per week at various times of the day           Weight (lb) < 85.7 kg (189 lb)   95.5 kg (210 lb 10.4 oz)        Notes - Note created  2016  4:27 PM by Macey Goel    Decrease weight by 5% to reduce cardiovascular risk factors         These Medications        Disp Refills Start End    guaifenesin-codeine 100-10 mg/5 ml (TUSSI-ORGANIDIN NR)  mg/5 mL syrup 180 mL 0 2017    Take 5 mLs by mouth 3 (three) times daily as needed. - Oral    Pharmacy: [facility]/pharmacy #[id number] - [place] - [address] [place] [address] #: [phone]         [facility]     [facility] Care [facility] -  Assistance  Unless otherwise directed by your provider, please contact [facility], our nurse care line that is available for  assistance.     Registered nurses in the [facility] provide: appointment scheduling, clinical advisement, health  education, and other advisory services.  Call: [phone] (toll free)               Medications           Message regarding Medications     Verify the changes and/or additions to your medication regime listed below are the same as discussed with your clinician today.  If any of these changes or additions are incorrect, please notify your healthcare provider.        START taking these NEW medications        Refills    guaifenesin-codeine 100-10 mg/5 ml (TUSSI-ORGANIDIN NR)  mg/5 mL syrup 0    Sig: Take 5 mLs by mouth 3 (three) times daily as needed.    Class: Print    Route: Oral           Verify that the below list of medications is an accurate representation of the medications you are currently taking.  If none reported, the list may be blank. If incorrect, please contact your healthcare provider. Carry this list with you in case of emergency.           Current Medications     amlodipine-valsartan-hcthiazid -25 mg Tab Take by mouth once daily.    ascorbic acid (VITAMIN C) 500 MG tablet Take 500 mg by mouth once daily.    aspirin (ECOTRIN) 81 MG EC tablet Take 81 mg by mouth once daily.    BIOTIN ORAL Take by mouth once daily.    blood sugar diagnostic (ACCU-CHEK BASILIA PLUS TEST STRP) Strp 1 strip by Misc.(Non-Drug; Combo Route) route 3 (three) times daily. Accu-chek Basilia Plus Strips    blood-glucose meter (ACCU-CHEK BASILIA PLUS METER) Misc 1 each by Misc.(Non-Drug; Combo Route) route as directed. Accu-chek Basilia Plus Meter    budesonide-formoterol 80-4.5 mcg (SYMBICORT) 80-4.5 mcg/actuation HFAA Inhale 2 puffs into the lungs 2 (two) times daily.    buPROPion (WELLBUTRIN XL) 150 MG TB24 tablet TAKE 1 TABLET (150 MG TOTAL) BY MOUTH ONCE DAILY.    canagliflozin (INVOKANA) 100 mg Tab Take 1 tablet (100 mg total) by mouth once daily.    carvedilol (COREG) 25 MG tablet Take 1 tablet (25 mg total) by mouth 2 (two) times daily with meals.    duloxetine (CYMBALTA) 60 MG capsule TAKE 1 CAPSULE EVERY DAY     "fish oil-omega-3 fatty acids 300-1,000 mg capsule Take 2 g by mouth once daily.    fluticasone (FLONASE) 50 mcg/actuation nasal spray 2 sprays by Each Nare route once daily.    gabapentin (NEURONTIN) 300 MG capsule Take 1 capsule (300 mg total) by mouth every evening.    HUMULIN R U-500, CONC, KWIKPEN 500 unit/mL (3 mL) InPn Place 100 Units onto the skin 2 (two) times daily.    levothyroxine (SYNTHROID) 75 MCG tablet Take 1 tablet (75 mcg total) by mouth once daily.    meclizine (ANTIVERT) 25 mg tablet Take 1 tablet (25 mg total) by mouth 3 (three) times daily as needed.    meloxicam (MOBIC) 7.5 MG tablet TAKE 1 TABLET TWICE DAILY    metformin (GLUCOPHAGE) 500 MG tablet TAKE 2 TABLETS (1,000 MG TOTAL) BY MOUTH 2 (TWO) TIMES DAILY WITH MEALS.    multivitamin (ONE DAILY MULTIVITAMIN) per tablet Take 1 tablet by mouth once daily.    NOVOTWIST 32 gauge x 1/5" Ndle USE AS DIRECTED TWICE A DAY    omeprazole (PRILOSEC) 40 MG capsule Take 1 capsule (40 mg total) by mouth once daily.    pen needle, diabetic (SURE COMFORT PEN NEEDLE) 31 gauge x 3/16" Ndle Inject 1 Stick into the skin 2 (two) times daily.    PSYLLIUM SEED, WITH DEXTROSE, (FIBER ORAL) Take 5 g by mouth once daily.    spironolactone (ALDACTONE) 25 MG tablet Take 2 tablets (50 mg total) by mouth every morning.    sumatriptan (IMITREX) 100 MG tablet Take 1 tablet (100 mg total) by mouth every 2 (two) hours as needed.    trazodone (DESYREL) 100 MG tablet 2 tabs oral before going to bed    VENTOLIN HFA 90 mcg/actuation inhaler INHALE 2 PUFFS INTO THE LUNGS EVERY 6 (SIX) HOURS AS NEEDED FOR WHEEZING.    guaifenesin-codeine 100-10 mg/5 ml (TUSSI-ORGANIDIN NR)  mg/5 mL syrup Take 5 mLs by mouth 3 (three) times daily as needed.    insulin syringe-needle U-100 0.3 mL 31 x 15/64" Syrg 1 Stick by Misc.(Non-Drug; Combo Route) route 2 (two) times daily.    ketoconazole (NIZORAL) 2 % cream Apply topically once daily.           Clinical Reference Information         " "  Your Vitals Were     BP Pulse Temp Height    134/70 (BP Location: Left arm, Patient Position: Sitting, BP Method: Manual) 96 98.4 °F (36.9 °C) (Oral) 5' 3" (1.6 m)    Weight Last Period SpO2 BMI    95.5 kg (210 lb 10.4 oz) 03/03/1978 (Within Months) 93% 37.31 kg/m2      Blood Pressure          Most Recent Value    BP  134/70      Allergies as of 5/19/2017     Pcn [Penicillins]    Silver Nitrate      Immunizations Administered on Date of Encounter - 5/19/2017     None      Instructions    Claritin daily for next 7 days  Cough medication as needed  Mucinex DM during the day for cough  Drink lots of water        Language Assistance Services     ATTENTION: Language assistance services are available, free of charge. Please call [phone].      ATENCIÓN: Si habla aileen, tiene a colunga disposición servicios gratuitos de asistencia lingüística. Llame al [phone].     CHÚ Ý: N?u b?n nói Ti?ng Vi?t, có các d?ch v? h? tr? ngôn ng? mi?n phí dành cho b?n. G?i s? [phone].         [facility] complies with applicable Federal civil rights laws and does not discriminate on the basis of race, color, national origin, age, disability, or sex.        "

## 2017-05-19 NOTE — PATIENT INSTRUCTIONS
Claritin daily for next 7 days  Cough medication as needed  Mucinex DM during the day for cough  Drink lots of water

## 2017-05-22 ENCOUNTER — TELEPHONE (OUTPATIENT)
Dept: FAMILY MEDICINE | Facility: CLINIC | Age: 67
End: 2017-05-22

## 2017-05-22 RX ORDER — ONDANSETRON HYDROCHLORIDE 8 MG/1
8 TABLET, FILM COATED ORAL EVERY 8 HOURS PRN
Qty: 15 TABLET | Refills: 0 | Status: SHIPPED | OUTPATIENT
Start: 2017-05-22

## 2017-05-22 NOTE — TELEPHONE ENCOUNTER
Patient stated you told her to give you a call back if she did not feel any better she stated she still coughing with a low grade temp of 99.2 and vomited yesterday please advise.

## 2017-05-22 NOTE — TELEPHONE ENCOUNTER
She states she is nauseated and vomiting once this am. Her throat feels better and she is not coughing as much. I told her that I would send a prescription of Zofran to the pharmacy. I told her to drink clear liquids and advance to a bland diet as tolerated. Patient verbalized understanding of above.

## 2017-05-22 NOTE — TELEPHONE ENCOUNTER
----- Message from Sissy Khan sent at 5/22/2017  8:04 AM CDT -----  Contact: self  Pt would like to speak with a nurse. Please call [phone]. Thanks

## 2017-05-25 ENCOUNTER — PATIENT OUTREACH (OUTPATIENT)
Dept: OTHER | Facility: OTHER | Age: 67
End: 2017-05-25
Payer: MEDICARE

## 2017-05-25 NOTE — PROGRESS NOTES
Last 5 Patient Entered Readings                                                               Current 30 Day Average: 149/81     Recent Readings 5/24/2017 5/15/2017 5/13/2017 5/11/2017 5/11/2017    Systolic BP (mmHg) 149 152 146 154 147    Diastolic BP (mmHg) 81 78 79 82 89    Pulse 98 97 85 92 95        Hypertension Medications             amlodipine-valsartan-hcthiazid -25 mg Tab Take by mouth once daily.    carvedilol (COREG) 25 MG tablet Take 1 tablet (25 mg total) by mouth 2 (two) times daily with meals.    spironolactone (ALDACTONE) 25 MG tablet Take 2 tablets (50 mg total) by mouth every morning.        Plan:   Called patient to follow up since increasing spironolactone to 50mg. Per current 30 day average, BP is not well controlled.   Patient states she has been sick, missed labs, will r/s.   Patient denies having questions or concerns. Will continue to monitor. WCB in 2 weeks.

## 2017-05-30 ENCOUNTER — PATIENT OUTREACH (OUTPATIENT)
Dept: OTHER | Facility: OTHER | Age: 67
End: 2017-05-30
Payer: MEDICARE

## 2017-05-30 ENCOUNTER — LAB VISIT (OUTPATIENT)
Dept: LAB | Facility: HOSPITAL | Age: 67
End: 2017-05-30
Attending: FAMILY MEDICINE
Payer: MEDICARE

## 2017-05-30 DIAGNOSIS — I10 ESSENTIAL HYPERTENSION: ICD-10-CM

## 2017-05-30 DIAGNOSIS — J44.9 MODERATE COPD (CHRONIC OBSTRUCTIVE PULMONARY DISEASE): ICD-10-CM

## 2017-05-30 DIAGNOSIS — F32.2 MAJOR DEPRESSIVE DISORDER, SINGLE EPISODE, SEVERE WITHOUT PSYCHOTIC FEATURES: ICD-10-CM

## 2017-05-30 LAB
ALBUMIN SERPL BCP-MCNC: 4 G/DL
ALP SERPL-CCNC: 78 U/L
ALT SERPL W/O P-5'-P-CCNC: 31 U/L
ANION GAP SERPL CALC-SCNC: 12 MMOL/L
AST SERPL-CCNC: 23 U/L
BILIRUB SERPL-MCNC: 0.4 MG/DL
BUN SERPL-MCNC: 26 MG/DL
CALCIUM SERPL-MCNC: 10.5 MG/DL
CHLORIDE SERPL-SCNC: 96 MMOL/L
CO2 SERPL-SCNC: 25 MMOL/L
CREAT SERPL-MCNC: 1.1 MG/DL
EST. GFR  (AFRICAN AMERICAN): 59.6 ML/MIN/1.73 M^2
EST. GFR  (NON AFRICAN AMERICAN): 51.7 ML/MIN/1.73 M^2
GLUCOSE SERPL-MCNC: 186 MG/DL
POTASSIUM SERPL-SCNC: 4.6 MMOL/L
PROT SERPL-MCNC: 8.1 G/DL
SODIUM SERPL-SCNC: 133 MMOL/L

## 2017-05-30 PROCEDURE — 80053 COMPREHEN METABOLIC PANEL: CPT

## 2017-05-30 PROCEDURE — 36415 COLL VENOUS BLD VENIPUNCTURE: CPT | Mod: PO

## 2017-05-30 RX ORDER — BUDESONIDE AND FORMOTEROL FUMARATE DIHYDRATE 80; 4.5 UG/1; UG/1
AEROSOL RESPIRATORY (INHALATION)
Qty: 3 INHALER | Refills: 11 | Status: SHIPPED | OUTPATIENT
Start: 2017-05-30

## 2017-05-30 RX ORDER — MECLIZINE HYDROCHLORIDE 25 MG/1
TABLET ORAL
Qty: 180 TABLET | Refills: 3 | Status: SHIPPED | OUTPATIENT
Start: 2017-05-30

## 2017-05-30 RX ORDER — GABAPENTIN 300 MG/1
CAPSULE ORAL
Qty: 90 CAPSULE | Refills: 3 | Status: SHIPPED | OUTPATIENT
Start: 2017-05-30

## 2017-05-30 RX ORDER — DULOXETIN HYDROCHLORIDE 60 MG/1
CAPSULE, DELAYED RELEASE ORAL
Qty: 90 CAPSULE | Refills: 0 | Status: SHIPPED | OUTPATIENT
Start: 2017-05-30

## 2017-05-30 RX ORDER — MELOXICAM 7.5 MG/1
TABLET ORAL
Qty: 180 TABLET | Refills: 0 | Status: SHIPPED | OUTPATIENT
Start: 2017-05-30 | End: 2017-08-21 | Stop reason: SDUPTHER

## 2017-05-30 RX ORDER — FLUTICASONE PROPIONATE 50 MCG
SPRAY, SUSPENSION (ML) NASAL
Qty: 48 G | Refills: 11 | Status: SHIPPED | OUTPATIENT
Start: 2017-05-30

## 2017-05-30 NOTE — PROGRESS NOTES
Last 5 Patient Entered Readings                                                               Current 30 Day Average: 149/82     Recent Readings 5/24/2017 5/15/2017 5/13/2017 5/11/2017 5/11/2017    Systolic BP (mmHg) 149 152 146 154 147    Diastolic BP (mmHg) 81 78 79 82 89    Pulse 98 97 85 92 95        Follow up with Ms. Vickie Hoskins completed. Ms. Hoskins has been sick. She is finally starting to feel a little better. Patient will take a BP reading today. She admits that she may be eating more sodium that usual in soups. Advised her to water down or look for low sodium soups. She will resume exercise once she is feeling better and has more energy. Patient did not have any further questions or concerns. I will follow up in a few weeks to see how she is doing and progressing.

## 2017-06-08 ENCOUNTER — PATIENT OUTREACH (OUTPATIENT)
Dept: OTHER | Facility: OTHER | Age: 67
End: 2017-06-08
Payer: MEDICARE

## 2017-06-08 NOTE — PROGRESS NOTES
Last 5 Patient Entered Readings                                                               Current 30 Day Average: 143/79     Recent Readings 6/4/2017 6/3/2017 5/31/2017 5/31/2017 5/24/2017    Systolic BP (mmHg) 121 141 125 133 149    Diastolic BP (mmHg) 68 83 71 80 81    Pulse 96 98 103 107 98        Hypertension Medications             amlodipine-valsartan-hcthiazid -25 mg Tab Take by mouth once daily.    carvedilol (COREG) 25 MG tablet Take 1 tablet (25 mg total) by mouth 2 (two) times daily with meals.    spironolactone (ALDACTONE) 25 MG tablet Take 2 tablets (50 mg total) by mouth every morning.        Plan:   Called patient to follow up. Per current 30 day average, BP is slightly uncontrolled. Patient denies having questions or concerns. Will continue to monitor. WCB in 1 month.

## 2017-06-20 ENCOUNTER — PATIENT OUTREACH (OUTPATIENT)
Dept: OTHER | Facility: OTHER | Age: 67
End: 2017-06-20
Payer: MEDICARE

## 2017-06-20 NOTE — PROGRESS NOTES
Last 5 Patient Entered Readings                                                               Current 30 Day Average: 132/74     Recent Readings 6/19/2017 6/14/2017 6/12/2017 6/4/2017 6/3/2017    Systolic BP (mmHg) 126 135 123 121 141    Diastolic BP (mmHg) 76 59 78 68 83    Pulse 97 89 79 96 98        Follow up with Ms. Vickie Hoskins completed. Ms. Hoskins is doing well. Patient reports that she has been walking again, but the weather has been holding her back. She is also monitoring her sodium intake. She is pleased to see BP trending downward. Patient did not have any further questions or concerns. I will follow up in a few weeks to see how she is doing and progressing.

## 2017-07-03 ENCOUNTER — OFFICE VISIT (OUTPATIENT)
Dept: PODIATRY | Facility: CLINIC | Age: 67
End: 2017-07-03
Payer: MEDICARE

## 2017-07-03 VITALS
HEIGHT: 63 IN | DIASTOLIC BLOOD PRESSURE: 76 MMHG | BODY MASS INDEX: 37.21 KG/M2 | SYSTOLIC BLOOD PRESSURE: 132 MMHG | WEIGHT: 210 LBS

## 2017-07-03 DIAGNOSIS — R20.2 PARESTHESIAS: ICD-10-CM

## 2017-07-03 DIAGNOSIS — E11.49 TYPE II DIABETES MELLITUS WITH NEUROLOGICAL MANIFESTATIONS: Primary | ICD-10-CM

## 2017-07-03 DIAGNOSIS — B35.1 ONYCHOMYCOSIS DUE TO DERMATOPHYTE: ICD-10-CM

## 2017-07-03 DIAGNOSIS — R20.8 BURNING SENSATION OF FOOT: ICD-10-CM

## 2017-07-03 DIAGNOSIS — R60.0 BILATERAL LOWER EXTREMITY EDEMA: ICD-10-CM

## 2017-07-03 PROCEDURE — 99999 PR PBB SHADOW E&M-EST. PATIENT-LVL III: CPT | Mod: PBBFAC,,, | Performed by: PODIATRIST

## 2017-07-03 PROCEDURE — 11721 DEBRIDE NAIL 6 OR MORE: CPT | Mod: Q9,S$GLB,, | Performed by: PODIATRIST

## 2017-07-03 PROCEDURE — 99499 UNLISTED E&M SERVICE: CPT | Mod: S$GLB,,, | Performed by: PODIATRIST

## 2017-07-03 RX ORDER — TRAMADOL HYDROCHLORIDE 50 MG/1
TABLET ORAL
COMMUNITY
Start: 2017-06-29

## 2017-07-03 RX ORDER — AMLODIPINE BESYLATE 10 MG/1
TABLET ORAL
COMMUNITY
Start: 2017-05-30 | End: 2017-07-06

## 2017-07-03 NOTE — PROGRESS NOTES
Subjective:      Patient ID: Vickie Hoskins is a 66 y.o. female.    Chief Complaint: Diabetes Mellitus (Pcp Dr. Graff  03/24/2017); Diabetic Foot Exam; and Nail Care    Vickie is a 66 y.o. female who presents to the clinic for evaluation and treatment of high risk feet. Vickie has a past medical history of Amblyopia; Anemia; Anxiety; Arthritis; Cataract; Depression; Diabetes mellitus, type 2; Diabetes with neurologic complications; Diabetic retinopathy; Glaucoma; Hyperlipidemia; Hypertension; Hypothyroidism; Insomnia; Moderate COPD (chronic obstructive pulmonary disease) (6/29/2016); NAFLD (nonalcoholic fatty liver disease); Obesity (BMI 30.0-34.9); Sleep apnea; Strabismus; Thyroid disease; Tobacco dependence; and Urinary incontinence. The patient's chief complaint is long, thick toenails and burning pain to tips of toes of both feet. Worse at night. Gabapentin 300mg nightly not helping much. Otherwise doing well.   This patient has documented high risk feet requiring routine maintenance secondary to diabetes mellitis and those secondary complications of diabetes, as mentioned..    PCP: Pari Graff MD    Date Last Seen by PCP: 3-24-17  Chief Complaint   Patient presents with    Diabetes Mellitus     Pcp Dr. Graff  03/24/2017    Diabetic Foot Exam    Nail Care         Current shoe gear:  Casual shoes         Hemoglobin A1C   Date Value Ref Range Status   04/17/2017 7.5 (H) 4.5 - 6.2 % Final     Comment:     According to ADA guidelines, hemoglobin A1C <7.0% represents  optimal control in non-pregnant diabetic patients.  Different  metrics may apply to specific populations.   Standards of Medical Care in Diabetes - 2016.  For the purpose of screening for the presence of diabetes:  <5.7%     Consistent with the absence of diabetes  5.7-6.4%  Consistent with increasing risk for diabetes   (prediabetes)  >or=6.5%  Consistent with diabetes  Currently no consensus exists for use of hemoglobin A1C  for  diagnosis of diabetes for children.     10/11/2016 6.9 (H) 4.5 - 6.2 % Final     Comment:     According to ADA guidelines, hemoglobin A1C <7.0% represents  optimal control in non-pregnant diabetic patients.  Different  metrics may apply to specific populations.   Standards of Medical Care in Diabetes - 2016.  For the purpose of screening for the presence of diabetes:  <5.7%     Consistent with the absence of diabetes  5.7-6.4%  Consistent with increasing risk for diabetes   (prediabetes)  >or=6.5%  Consistent with diabetes  Currently no consensus exists for use of hemoglobin A1C  for diagnosis of diabetes for children.     04/08/2016 10.2 (H) 4.5 - 6.2 % Final     Past Medical History:   Diagnosis Date    Amblyopia     Anemia     Anxiety     Arthritis     Cataract     Depression     Diabetes mellitus, type 2     Diabetes with neurologic complications     Diabetic retinopathy     Glaucoma     Hyperlipidemia     Hypertension     Hypothyroidism     Insomnia     Moderate COPD (chronic obstructive pulmonary disease) 6/29/2016    NAFLD (nonalcoholic fatty liver disease)     Obesity (BMI 30.0-34.9)     Sleep apnea     Has a C-pap device, but does not use it.    Strabismus     Thyroid disease     nodules    Tobacco dependence     resolved 1985    Urinary incontinence        Past Surgical History:   Procedure Laterality Date    APPENDECTOMY  1978 approx    incidental     CATARACT EXTRACTION Bilateral     CHOLECYSTECTOMY  1975 approx    EYE SURGERY Bilateral 2012    cataract w/IOL    FOOT SURGERY Left 10/23/15    left hindfoot calcanectomy and bursectomy    HYSTERECTOMY  1978    SOLANGE     STRABISMUS SURGERY Left at age 1        Family History   Problem Relation Age of Onset    Cancer Mother      unsure what kind    Heart disease Father     Hypertension Father     Alcohol abuse Father     Cancer Maternal Aunt      bone cancer    Diabetes Maternal Aunt     Hypertension Sister     Alcohol abuse  Sister     Hypertension Son     Alcohol abuse Son      DWI    Hypertension Son     Alcohol abuse Son      DWI    Hypertension Paternal Uncle     Heart disease Paternal Uncle     Hypertension Maternal Grandmother     Heart disease Maternal Grandmother     Cancer Maternal Aunt     Mental illness Cousin     No Known Problems Brother     No Known Problems Maternal Uncle     No Known Problems Paternal Aunt     No Known Problems Maternal Grandfather     No Known Problems Paternal Grandmother     No Known Problems Paternal Grandfather     Stroke Neg Hx     Mental retardation Neg Hx     Drug abuse Neg Hx     Amblyopia Neg Hx     Blindness Neg Hx     Cataracts Neg Hx     Glaucoma Neg Hx     Macular degeneration Neg Hx     Retinal detachment Neg Hx     Strabismus Neg Hx     Thyroid disease Neg Hx        Social History     Social History    Marital status:      Spouse name: N/A    Number of children: N/A    Years of education: N/A     Social History Main Topics    Smoking status: Former Smoker     Packs/day: 1.00     Years: 30.00     Types: Cigarettes     Quit date: 1985    Smokeless tobacco: Never Used    Alcohol use 0.0 oz/week      Comment: maybe once a year    Drug use: No    Sexual activity: Not Currently     Partners: Male     Other Topics Concern    None     Social History Narrative    Pt's  is  since , currently lives with her sister and brother in law. She has 3 children ( One son with HTN but otherwise in good health)Sons live in [facility], and daughter in [place], [place]. Patient is retired from childcare work. She still drives a car. Coffee intake 1-2 cups a day. She does not have Living Will or Advanced Directive.        Current Outpatient Prescriptions   Medication Sig Dispense Refill    amlodipine (NORVASC) 10 MG tablet       amlodipine-valsartan-hcthiazid -25 mg Tab Take by mouth once daily.      ascorbic acid (VITAMIN C) 500  "MG tablet Take 500 mg by mouth once daily.      aspirin (ECOTRIN) 81 MG EC tablet Take 81 mg by mouth once daily.      BIOTIN ORAL Take by mouth once daily.      blood sugar diagnostic (ACCU-CHEK BASILIA PLUS TEST STRP) Strp 1 strip by Misc.(Non-Drug; Combo Route) route 3 (three) times daily. Accu-chek Basilia Plus Strips 100 strip 11    blood-glucose meter (ACCU-CHEK BASILIA PLUS METER) Misc 1 each by Misc.(Non-Drug; Combo Route) route as directed. Accu-chek Basilia Plus Meter 1 each 0    buPROPion (WELLBUTRIN XL) 150 MG TB24 tablet TAKE 1 TABLET (150 MG TOTAL) BY MOUTH ONCE DAILY. 30 tablet 4    canagliflozin (INVOKANA) 100 mg Tab Take 1 tablet (100 mg total) by mouth once daily. 90 tablet 3    carvedilol (COREG) 25 MG tablet Take 1 tablet (25 mg total) by mouth 2 (two) times daily with meals. 180 tablet 3    duloxetine (CYMBALTA) 60 MG capsule TAKE 1 CAPSULE EVERY DAY 90 capsule 0    fish oil-omega-3 fatty acids 300-1,000 mg capsule Take 2 g by mouth once daily.      fluticasone (FLONASE) 50 mcg/actuation nasal spray USE 2 SPRAYS IN EACH NOSTRIL ONE TIME DAILY 48 g 11    gabapentin (NEURONTIN) 300 MG capsule TAKE 1 CAPSULE EVERY EVENING 90 capsule 3    HUMULIN R U-500, CONC, KWIKPEN 500 unit/mL (3 mL) InPn Place 100 Units onto the skin 2 (two) times daily. 72 mL 0    ketoconazole (NIZORAL) 2 % cream Apply topically once daily. 60 g 0    levothyroxine (SYNTHROID) 75 MCG tablet Take 1 tablet (75 mcg total) by mouth once daily. 90 tablet 3    meclizine (ANTIVERT) 25 mg tablet TAKE 1 TABLET 3 TIMES DAILY AS NEEDED 180 tablet 3    meloxicam (MOBIC) 7.5 MG tablet TAKE 1 TABLET TWICE DAILY 180 tablet 0    metformin (GLUCOPHAGE) 500 MG tablet TAKE 2 TABLETS (1,000 MG TOTAL) BY MOUTH 2 (TWO) TIMES DAILY WITH MEALS. 360 tablet 2    multivitamin (ONE DAILY MULTIVITAMIN) per tablet Take 1 tablet by mouth once daily.      NOVOTWIST 32 gauge x 1/5" Ndle USE AS DIRECTED TWICE A  each 10    omeprazole " "(PRILOSEC) 40 MG capsule Take 1 capsule (40 mg total) by mouth once daily. 90 capsule 3    ondansetron (ZOFRAN) 8 MG tablet Take 1 tablet (8 mg total) by mouth every 8 (eight) hours as needed for Nausea. 15 tablet 0    pen needle, diabetic (SURE COMFORT PEN NEEDLE) 31 gauge x 3/16" Ndle Inject 1 Stick into the skin 2 (two) times daily. 200 each 3    PSYLLIUM SEED, WITH DEXTROSE, (FIBER ORAL) Take 5 g by mouth once daily.      spironolactone (ALDACTONE) 25 MG tablet Take 2 tablets (50 mg total) by mouth every morning. 180 tablet 3    sumatriptan (IMITREX) 100 MG tablet Take 1 tablet (100 mg total) by mouth every 2 (two) hours as needed. (Patient taking differently: Take 100 mg by mouth every 2 (two) hours as needed. ) 9 tablet 4    SYMBICORT 80-4.5 mcg/actuation HFAA INHALE 2 PUFFS INTO THE LUNGS TWO TIMES DAILY. 3 Inhaler 11    tramadol (ULTRAM) 50 mg tablet       trazodone (DESYREL) 100 MG tablet 2 tabs oral before going to bed 180 tablet 3    VENTOLIN HFA 90 mcg/actuation inhaler INHALE 2 PUFFS INTO THE LUNGS EVERY 6 (SIX) HOURS AS NEEDED FOR WHEEZING. 18 g 5    insulin syringe-needle U-100 0.3 mL 31 x 15/64" Syrg 1 Stick by Misc.(Non-Drug; Combo Route) route 2 (two) times daily. 100 Syringe 0     No current facility-administered medications for this visit.        Review of patient's allergies indicates:   Allergen Reactions    Pcn [penicillins] Other (See Comments)     Pt was told by mother that she was allergic to PCN.    Silver nitrate Other (See Comments)     Remained on skin more than week       Review of Systems   Constitution: Negative for chills and fever.   Cardiovascular: Negative for chest pain, claudication and leg swelling.   Respiratory: Negative for cough and shortness of breath.    Skin: Positive for dry skin and nail changes.   Musculoskeletal: Negative.    Gastrointestinal: Negative for nausea and vomiting.   Neurological: Positive for numbness, paresthesias and sensory change. "   Psychiatric/Behavioral: Negative for altered mental status.           Objective:      Physical Exam   Constitutional: She is oriented to person, place, and time. She appears well-developed and well-nourished.   HENT:   Head: Normocephalic.   Cardiovascular: Intact distal pulses.    Pulses:       Dorsalis pedis pulses are 2+ on the right side, and 2+ on the left side.        Posterior tibial pulses are 1+ on the right side, and 1+ on the left side.   CRT < 3 sec to tips of toes. Mild edema noted to b/l LE. Mild vericosities noted to b/l LEs.      Pulmonary/Chest: No respiratory distress.   Musculoskeletal:   Gastrocnemius equinus noted to b/l ankles with decreased DF noted on exam. MMT 5/5 in DF/PF/Inv/Ev resistance with no reproduction of pain in any direction. Passive range of motion of ankle and pedal joints is painless. Adequate pedal joint ROM.      Neurological: She is alert and oriented to person, place, and time. She has normal strength. A sensory deficit is present.   Light touch, proprioception, and sharp/dull sensation are all intact bilaterally. Protective threshold with the [place]-Wienstein monofilament is intact bilaterally. Vibratory sensation diminished b/l distal foot. Subjective paresthesias with no clearly identifiable source or trigger.      Skin: Skin is warm, dry and intact. No erythema.   No open lesions, lacerations or wounds noted. Nails are thickened, elongated, discolored yellow/brown with subungual debris and brittleness to R 1-5 and L 1-5. Interdigital spaces clean, dry and intact b/l. No erythema noted to b/l foot. Skin texture thin, dry, atrophic. Pedal hair absent. Skin temperature normal b/l foot.      Psychiatric: She has a normal mood and affect. Her behavior is normal. Judgment and thought content normal.   Vitals reviewed.            Assessment:       Encounter Diagnoses   Name Primary?    Type II diabetes mellitus with neurological manifestations Yes    Paresthesias      Onychomycosis due to dermatophyte     Bilateral lower extremity edema     Burning sensation of foot          Plan:       Vickie was seen today for diabetes mellitus, diabetic foot exam and nail care.    Diagnoses and all orders for this visit:    Type II diabetes mellitus with neurological manifestations    Paresthesias    Onychomycosis due to dermatophyte    Bilateral lower extremity edema    Burning sensation of foot      I counseled the patient on her conditions, their implications and medical management.        - Shoe inspection. Diabetic Foot Education. Patient reminded of the importance of good nutrition and blood sugar control to help prevent podiatric complications of diabetes. Patient instructed on proper foot hygeine. We discussed wearing proper shoe gear, daily foot inspections, never walking without protective shoe gear, never putting sharp instruments to feet, routine podiatric nail visits every 3-4 months.      - With patient's permission, nails were aggressively reduced and debrided x 10 to their soft tissue attachment mechanically and with electric , removing all offending nail and debris. Patient relates relief following the procedure. She will continue to monitor the areas daily, inspect her feet, wear protective shoe gear when ambulatory, moisturizer to maintain skin integrity and follow in this office in approximately 3-4 months, sooner p.r.n.    - Discussed regular and routine moisturizer to skin of both feet to help improve dry skin. Advised to apply twice daily until resolution of symptoms. Avoid between toes.     - Continue Gabapentin 300mg nightly for nerve pain. Discuss with PCP regarding increasing dose of this medication.     - Discussed use of Rx compound nerve medication however patient would like to hold off for now stating the pain is not bad enough. Advised to monitor for any worsening.     - RTC 3 months, sooner PRN

## 2017-07-06 ENCOUNTER — PATIENT OUTREACH (OUTPATIENT)
Dept: OTHER | Facility: OTHER | Age: 67
End: 2017-07-06

## 2017-07-06 DIAGNOSIS — I10 ESSENTIAL HYPERTENSION: Primary | ICD-10-CM

## 2017-07-06 RX ORDER — AMLODIPINE BESYLATE 10 MG/1
10 TABLET ORAL NIGHTLY
Qty: 90 TABLET | Refills: 3 | Status: SHIPPED | OUTPATIENT
Start: 2017-07-06 | End: 2017-07-13 | Stop reason: ALTCHOICE

## 2017-07-06 RX ORDER — VALSARTAN AND HYDROCHLOROTHIAZIDE 320; 25 MG/1; MG/1
1 TABLET, FILM COATED ORAL EVERY MORNING
Qty: 90 TABLET | Refills: 3 | Status: SHIPPED | OUTPATIENT
Start: 2017-07-06 | End: 2018-07-06

## 2017-07-06 NOTE — PROGRESS NOTES
Last 5 Patient Entered Readings                                                               Current 30 Day Average: 131/72     Recent Readings 7/6/2017 7/6/2017 7/5/2017 7/2/2017 6/28/2017    Systolic BP (mmHg) 139 144 148 121 113    Diastolic BP (mmHg) 81 82 78 77 65    Pulse 80 80 92 77 72        Hypertension Medications             amlodipine-valsartan-hcthiazid -25 mg Tab Take by mouth once daily.    carvedilol (COREG) 25 MG tablet Take 1 tablet (25 mg total) by mouth 2 (two) times daily with meals.    spironolactone (ALDACTONE) 25 MG tablet Take 2 tablets (50 mg total) by mouth every morning.        Plan:   Called patient to follow up. Per current 30 day average, BP is well controlled. Patient denies having questions or concerns. Will continue to monitor. WCB in 4 months, sooner if BP begins to trend up or down.

## 2017-07-13 ENCOUNTER — HOSPITAL ENCOUNTER (OUTPATIENT)
Dept: PREADMISSION TESTING | Facility: HOSPITAL | Age: 67
Discharge: HOME | End: 2017-07-13
Attending: PHYSICAL MEDICINE & REHABILITATION
Payer: MEDICARE

## 2017-07-13 ENCOUNTER — TELEPHONE (OUTPATIENT)
Dept: FAMILY MEDICINE | Facility: CLINIC | Age: 67
End: 2017-07-13

## 2017-07-13 DIAGNOSIS — Z01.818 PRE-OP TESTING: Primary | ICD-10-CM

## 2017-07-13 LAB
ANION GAP SERPL CALC-SCNC: 12 MMOL/L
BASOPHILS # BLD AUTO: 0.03 K/UL
BASOPHILS NFR BLD: 0.2 %
BUN SERPL-MCNC: 27 MG/DL
CALCIUM SERPL-MCNC: 9.9 MG/DL
CHLORIDE SERPL-SCNC: 96 MMOL/L
CO2 SERPL-SCNC: 25 MMOL/L
CREAT SERPL-MCNC: 0.8 MG/DL
DIFFERENTIAL METHOD: ABNORMAL
EOSINOPHIL # BLD AUTO: 0.1 K/UL
EOSINOPHIL NFR BLD: 1.1 %
ERYTHROCYTE [DISTWIDTH] IN BLOOD BY AUTOMATED COUNT: 13.7 %
EST. GFR  (AFRICAN AMERICAN): >60 ML/MIN/1.73 M^2
EST. GFR  (NON AFRICAN AMERICAN): >60 ML/MIN/1.73 M^2
GLUCOSE SERPL-MCNC: 112 MG/DL
HCT VFR BLD AUTO: 44.8 %
HGB BLD-MCNC: 15.5 G/DL
LYMPHOCYTES # BLD AUTO: 2.7 K/UL
LYMPHOCYTES NFR BLD: 22.3 %
MCH RBC QN AUTO: 30.2 PG
MCHC RBC AUTO-ENTMCNC: 34.6 %
MCV RBC AUTO: 87 FL
MONOCYTES # BLD AUTO: 1.1 K/UL
MONOCYTES NFR BLD: 9.3 %
NEUTROPHILS # BLD AUTO: 8.2 K/UL
NEUTROPHILS NFR BLD: 67.1 %
PLATELET # BLD AUTO: 188 K/UL
PMV BLD AUTO: 10.9 FL
POTASSIUM SERPL-SCNC: 4.1 MMOL/L
RBC # BLD AUTO: 5.13 M/UL
SODIUM SERPL-SCNC: 133 MMOL/L
WBC # BLD AUTO: 12.15 K/UL

## 2017-07-13 PROCEDURE — 93005 ELECTROCARDIOGRAM TRACING: CPT

## 2017-07-13 PROCEDURE — 85025 COMPLETE CBC W/AUTO DIFF WBC: CPT

## 2017-07-13 PROCEDURE — 80048 BASIC METABOLIC PNL TOTAL CA: CPT

## 2017-07-13 PROCEDURE — 36415 COLL VENOUS BLD VENIPUNCTURE: CPT

## 2017-07-13 NOTE — DISCHARGE INSTRUCTIONS
Your surgery is scheduled for___7/17/2017______________.    Call [phone] between 2 pm and 5 pm __7/14/2017__________ to find out your arrival time for the day of surgery.    Report to SAME DAY SURGERY UNIT at _______am on the 2nd floor of the hospital.  Use the front entrance of the hospital before 6 am.  If you need wheelchair assistance, call [phone] from your cell phone,  or call 0 from the courtesy phone in the hospital lobby.    Important instructions:   Do not eat or drink after 12 midnight, including water.  It is okay to brush your teeth.  Do not have gum, candy or mints.    Take only these medications with a small swallow of water on the morning of your surgery._carvedilol, cymbalta, gabapentin, synthroid, symbicort         Please shower the night before and the morning of your surgery.       Use Hibiclens soap to your surgery site if instructed by your pre op nurse.   Be sure to rinse off Hibiclens after it is on your skin for several minutes.  Do not use Hibiclens on your face or genitals.      Do not wear make- up, including mascara.     You may wear deodorant only.      Do not wear powder, body lotion or cologne.     Do not wear any jewelry or have any metal on your body.     Please bring any documents given to you by your doctor.     If you are going home on the same day of surgery, you must have arrangements for a ride home.  You will not be able to drive home if you were given anesthesia or sedation.     Do not take any diabetic medication on the morning of surgery unless instructed to do so by your doctor or pre op nurse.     Stop taking Aspirin, Ibuprofen, Motrin and Aleve at least 3-5 days before your surgery. You may use Tylenol.     Stop taking fish oil and vitamin E for least 5 days before surgery.     Wear loose fitting clothes allowing for bandages.     Please leave money and valuables home.       You may bring your cell phone.     Call the doctor if fever or illness  should occur before your surgery.    Call [phone] to contact us here at [facility] if needed.

## 2017-07-13 NOTE — TELEPHONE ENCOUNTER
----- Message from Diane Koo sent at 7/13/2017 11:13 AM CDT -----  Patient would like to see if she can get something. She is dizzy when she gets up. Please call at [phone] Thank you!

## 2017-07-14 ENCOUNTER — OFFICE VISIT (OUTPATIENT)
Dept: FAMILY MEDICINE | Facility: CLINIC | Age: 67
End: 2017-07-14
Payer: MEDICARE

## 2017-07-14 VITALS
OXYGEN SATURATION: 97 % | BODY MASS INDEX: 36.19 KG/M2 | WEIGHT: 204.25 LBS | HEIGHT: 63 IN | TEMPERATURE: 98 F | SYSTOLIC BLOOD PRESSURE: 130 MMHG | DIASTOLIC BLOOD PRESSURE: 80 MMHG | HEART RATE: 87 BPM

## 2017-07-14 DIAGNOSIS — R42 VERTIGO: Primary | ICD-10-CM

## 2017-07-14 DIAGNOSIS — Z12.31 OTHER SCREENING MAMMOGRAM: ICD-10-CM

## 2017-07-14 PROCEDURE — 99214 OFFICE O/P EST MOD 30 MIN: CPT | Mod: S$GLB,,, | Performed by: NURSE PRACTITIONER

## 2017-07-14 PROCEDURE — 1157F ADVNC CARE PLAN IN RCRD: CPT | Mod: S$GLB,,, | Performed by: NURSE PRACTITIONER

## 2017-07-14 PROCEDURE — 99999 PR PBB SHADOW E&M-EST. PATIENT-LVL V: CPT | Mod: PBBFAC,,, | Performed by: NURSE PRACTITIONER

## 2017-07-14 PROCEDURE — 1159F MED LIST DOCD IN RCRD: CPT | Mod: S$GLB,,, | Performed by: NURSE PRACTITIONER

## 2017-07-14 PROCEDURE — 1126F AMNT PAIN NOTED NONE PRSNT: CPT | Mod: S$GLB,,, | Performed by: NURSE PRACTITIONER

## 2017-07-14 NOTE — PATIENT INSTRUCTIONS
Vertigo (Unknown Cause)    In addition to helping with hearing, the inner ear is part of the balance center of your body. Problems with the inner ear can a false feeling of motion. This is called vertigo. Often, it feels as if you or the room is spinning. A vertigo attack may cause sudden nausea, vomiting and heavy sweating. Severe vertigo causes a loss of balance and can cause you to fall. During vertigo, small head movements and changes in body position will often make the symptoms worse. You may also have ringing in the ears called tinnitus.  An episode of vertigo may last seconds, minutes or hours. Once you are over the first episode, it may never come back. However, symptoms may return off and on.  The cause of your vertigo is not yet known. Possible causes of vertigo include:  · Inflammation of the inner ear  · Disease of the nerves to the inner ear  · Movement of calcium particles in the inner ear  · Poor blood flow to the balance centers of the brain  · Migraine headaches  Home care  · If symptoms are severe, rest quietly in bed. Change positions very slowly. There is usually one position that will feel best, such as lying on one side or lying on your back with your head slightly raised on pillows.  · Do not drive a car or work with dangerous machinery until symptoms have been gone for at least one week.  · Take medicine as prescribed to relieve your symptoms. Unless another medicine was prescribed for symptoms of nausea, vomiting, and dizziness, you may use over-the-counter motion sickness pills. Ask your pharmacist for suggestions.  Follow-up care  Follow up with your healthcare provider or as directed. If you are referred to a specialist or for testing, make the appointment promptly.  When to seek medical advice  Call your healthcare provider if any of the following occur:  · Fever of 100.4°F (38ºC) or higher, or as directed by your healthcare provider  · Vertigo worsens or is not controlled  by prescribed medicine   · Repeated vomiting not relieved by prescribed medicine   · Severe headache  · Confusion  · Weakness of an arm or leg or one side of the face  · Difficulty with speech or vision  · Loss of consciousness   · Seizure  Date Last Reviewed: 8/16/2015 © 2000-2016 [employer]. [address] [place] [address]. All rights reserved. This information is not intended as a substitute for professional medical care. Always follow your healthcare professional's instructions.        Benign Paroxysmal Positional Vertigo    Benign paroxysmal positional vertigo is a common condition. You feel as if the room is spinning after changing position, moving your head quickly, or even just rolling over in bed.  Vertigo is a false feeling of motion plus disorientation that makes it seem as though the room is spinning. A vertigo attack may cause sudden nausea, vomiting, and heavy sweating. Severe vertigo causes a loss of balance. You may even fall down.  Vertigo is caused by a problem with the inner ear. The inner ear is located behind the middle ear. It is a part of the balance center of the body. It contains small calcium particles within fluid-filled canals (semi-circular canals). These particles can move out of position as a result of aging, head trauma, or disease of the inner ear. Once that happens, moving your head in certain ways may cause the particles to stimulate the inner ear. This creates the feeling of vertigo.  An episode of vertigo may last seconds, minutes, or hours. Once you are over the first episode of vertigo, it may never return. Sometimes symptoms return off and on over several weeks or longer.  Home care  Follow these guidelines when caring for yourself at home:  · Rest quietly in bed if your symptoms are severe. Change position slowly. There is usually one position that will feel best. This might be lying on one side or lying on your back with your head slightly raised on  pillows.  · Do not drive or work with dangerous machinery for one week after symptoms disappear. This is in case symptoms return suddenly.  · Take medicine as prescribed to relieve your symptoms. Unless another medicine was prescribed for nausea, vomiting, and vertigo, you may use over-the-counter motion sickness medicine, such as meclizine or dimenhydrinate.  Follow-up care  Follow up with your healthcare provider, or as directed. Tell your provider about any ringing in the ear or hearing loss.  If you had a CT or MRI scan, a specialist will review it. You will be told of any new findings that may affect your care.  When to seek medical advice  Call your healthcare provider right away if any of these occur:  · Vertigo gets worse even after taking prescribed medicine  · Repeated vomiting even after taking prescribed medicine  · Increased weakness or fainting  · Severe headache or unusual drowsiness or confusion  · Weakness of an arm or leg or one side of the face  · Difficulty walking  · Difficulty with speech or vision  · Seizure  · Trouble hearing  · Fever of 100.4ºF (38ºC) or higher, or as directed by your healthcare provider  · Fast heart rate  · Chest pain   Date Last Reviewed: 7/10/2015  © 2168-2503 [employer]. [address] [place] [address], [place] [address]. All rights reserved. This information is not intended as a substitute for professional medical care. Always follow your healthcare professional's instructions.        What is Insomnia?  Do you have trouble falling asleep? Do you wake up often during the night? Or, maybe you wake up too early in the morning. You may be suffering from insomnia. Talk to your health care provider if it lasts longer than a few weeks and you feel tired most of the time.    What causes insomnia?  Some common causes of insomnia are:  · Medical problems such as pain, depression, medication side effects, or trouble breathing  · Circadian rhythm disorder, a shift in the  bodys normal 24-hour activity cycle  · Lifestyle factors such as a changing sleep schedule, lack of exercise, or too much caffeine  · Sleep settings such as a poor mattress, noise, or a room thats too hot or too cold  · Stress such as problems at work, money worries, or family events  Talk to your health care provider  Describe your sleeping problems to your health care provider. Try to keep a daily sleep diary for a couple weeks. Write down the time you go to bed, the time you wake up, and anything that seems to affect your sleep. Your health care provider can work with you to develop a treatment plan. You may need to learn good sleeping habits and make some lifestyle changes. If you have any medical problems, these may need to be treated first.  Date Last Reviewed: 7/18/2015  © 4626-1508 [employer]. [address] [place] [address]. All rights reserved. This information is not intended as a substitute for professional medical care. Always follow your healthcare professional's instructions.        Treating Insomnia  Good sleeping habits are a key part of treatment. If needed, some medications may help you sleep better at first. Making healthy lifestyle changes and learning to relax can improve your sleep. Treating insomnia takes commitment, but trust that your efforts will pay off. Talk to your health care provider before taking any medication.    Healthy lifestyle  Your lifestyle affects your health and your sleep. Here are some healthy habits:  · Keep a regular sleep schedule. Go to bed and get up at the same time each day.  · Exercise regularly. It may help you reduce stress. Avoid strenuous exercise for 2 to 4 hours before bedtime.  · Avoid or limit naps, especially in the late afternoon.  · Use your bed only for sleep and sex.  · Dont spend too much time in bed trying to fall asleep. If you cant fall asleep, get up and do something (no electronics) until you become tired and  drowsy.  · Avoid or limit caffeine and nicotine for up to 6 hours before bedtime. They can keep you awake at night.   · Also avoid alcohol for at least 4 to 6 hours before bedtime. It may help you fall asleep at first, but you will have more awakenings throughout the night, and your sleep will not be restful.  Before bedtime  To sleep better every night, try these tips:  · Have a bedtime routine to let your body and mind know when its time to sleep.  · Think of going to bed as relaxing and enjoyable. Sleep will come sooner.  · If your worries dont let you sleep, write them down in a diary. Then close it, and go to bed.  · Make sure the room is not too hot or too cold. If its not dark enough, an eye mask can help. If its noisy, try using earplugs.  Learn to relax  Stress, anxiety, and body tension may keep you awake at night. To unwind before bedtime, try a warm bath, meditation, or yoga. Also, try the following:  · Deep breathing. Sit or lie back in a chair. Take a slow, deep breath. Hold it for 5 counts. Then breathe out slowly through your mouth. Keep doing this until you feel relaxed.  · Progressive muscle relaxation. Tense and then relax the muscles in your body as you breathe deeply. Start with your feet and work up your body to your neck and face.  Date Last Reviewed: 7/18/2015  © 9481-5175 [employer]. [address] [place] [address], [place] [address]. All rights reserved. This information is not intended as a substitute for professional medical care. Always follow your healthcare professional's instructions.        Insomnia  Insomnia is repeated difficulty going to sleep or staying asleep, or both. Whether you have insomnia is not defined by a specific amount of sleep. Different people need different amounts of sleep, and you may need more or less sleep at different times of your life.  There are 3 major types of insomnia:  short-term, chronic, and other.  Short-term, or acute insomnia lasts less  than 3 months.  The symptoms are temporary and can be linked directly to a stressor, such as the death of a loved one, financial problems, or a new physical problem.  Short-term insomnia stops when the stressor resolves or the person adapts to its presence.  Chronic insomnia occurs at least 3 times a week and lasts longer than 3 months.  Chronic insomnia can occur when either the cause of the sleeping problem is not clear, or the insomnia does not get better when the stressor is resolved. A number of other criteria are also used to make the diagnosis of chronic insomnia.   Other insomnia is the third type of insomnia-related sleep disorders.  This description applies to people who have problems getting to sleep or staying asleep, but do not meet all of the factors that describe either short-term or chronic insomnia.    Many things cause insomnia. Different people may have different causes. It can be from an underlying medical or psychological condition, or lifestyle. It can also be primary insomnia, which means no cause can be found.  Causes of insomnia include:  · Chronic medical problems- heart disease, gastrointestinal problems, hormonal changes, breathing problems  · Anxiety  · Stress  · Depression  · Pain  · Work schedule  · Sleep apnea  · Illegal drugs  · Certain medicines  Many different medidcines can affect your sleep, such as stimulants, caffeine, alcohol, some decongestants, and diet pills. Other medicines may include some types of blood pressure pills, steroids, asthma medicines, antihistamines, antidepressants, seizure medicines and statins. Not all of these will affect your sleep, and they shouldnt be stopped without talking to your doctor.  Symptoms of insomnia can include:  · Lying awake for long periods at night before falling asleep  · Waking up several times during the night  · Waking up early in the morning and not being able to get back to sleep  · Feeling tired and not refreshed by  sleep  · Not being able to function properly during the day and finding it hard to concentrate  · Irritability  · Tiredness and fatigue during the day  Home care  1. Review your medicines with your doctor or pharmacist to find out if they can cause insomnia. Not all medicines will affect your sleep, but they shouldn't be stopped without reviewing them with your doctor. There may be serious side effects and consequences from suddenly stopping your medicines. Not taking them may cause strokes, heart attacks, and many other problems.  2. Caffeine, smoking and alcohol also affect sleep. Limit your daily use and do not use these before bedtime. Alcohol may make you sleepy at first, but as its effects wear off, you may awaken a few hours later and have trouble returning to sleep.  3. Do not exercise, eat or drink large amounts of liquid within 2 hours of your bedtime.  4. Improve your sleep habits. Have a fixed bed and wake-up time. Try to keep noise, light and heat in your bedroom at a comfortable level. Try using earplugs or eyeshades if needed.   5. Avoid watching TV in bed.  6. If you do not fall asleep within 30 minutes, try to relax by reading or listening to soft music.  7. Limit daytime napping to one 30 minute period, early in the day.  8. Get regular exercise. Find other ways to lessen your stress level.  9. If a medicine was prescribed to help reset your sleep patterns, take it as directed. Sleeping pills are intended for short-term use, only. If taken for too long, the effect wears off while the risk of physical addiction and psychological dependence increases.  Sleep diary  If the cause isnt obvious and it is not improving, try keeping a sleep diary for a couple of weeks. Include in it:  · The time you go to bed  · How long it takes to fall asleep  · How many times you wake up  · What time you wake up  · Your meal times and what you eat  · What time you drink alcohol  · Your exercise habits and  times  Follow-up care  Follow up with your healthcare provider, or as advised. If X-rays or CT scans were done, you will be notified if there is a change in the reading, especially if it affects treatment.  Call 911  Call 911 if any of these occur:  · Trouble breathing  · Confusion or trouble waking  · Fainting or loss of consciousness  · Rapid heart rate  · New chest, arm, shoulder, neck or upper back pain  · Trouble with speech or vision, weakness of an arm or leg  · Trouble walking or talking, loss of balance, numbness or weakness in one side of your body, facial droop  When to seek medical advice  Call your healthcare provider right away if any of these occur:  · Extreme restlessness or irritability  · Confusion or hallucinations (seeing or hearing things that are not there)  · Anxiety, depression  · Several days without sleeping  Date Last Reviewed: 11/19/2015  © 7687-7520 [employer]. [address] [place] [address], [place] [address]. All rights reserved. This information is not intended as a substitute for professional medical care. Always follow your healthcare professional's instructions.

## 2017-07-14 NOTE — PROGRESS NOTES
Subjective:       Patient ID: Vickie Hoskins is a 66 y.o. female.    Chief Complaint: Dizziness (pt states when getting into bed and getting up has to get balance before moving.)    Dizziness:   Chronicity:  New and recurrent  Onset:  In the past 7 days  Progression since onset:  Gradually worsening  Frequency - weeks/days included: every day.  Duration:  5 minutes  Dizziness characteristics:  Spinning inside head only  Initial Spell Date and Length:  2 minutes  Frequency of Spells:  Dailyno hearing loss, no tinnitus, no nausea, no vomiting, no weakness, no visual disturbances, no light-headedness, no syncope and no palpitations.  Aggravated by:  Getting up and lying down  Treatments tried:  Nothing      Past Medical History:   Diagnosis Date    Amblyopia     Anemia     Anxiety     Arthritis     Cataract     Depression     Diabetes mellitus, type 2     Diabetes with neurologic complications     Diabetic retinopathy     Glaucoma     Hyperlipidemia     Hypertension     Hypothyroidism     Insomnia     Moderate COPD (chronic obstructive pulmonary disease) 2016    NAFLD (nonalcoholic fatty liver disease)     Obesity (BMI 30.0-34.9)     Sleep apnea     Has a C-pap device, but does not use it.    Strabismus     Thyroid disease     nodules    Tobacco dependence     resolved     Urinary incontinence        Social History     Social History    Marital status:      Spouse name: N/A    Number of children: N/A    Years of education: N/A     Occupational History    Not on file.     Social History Main Topics    Smoking status: Former Smoker     Packs/day: 1.00     Years: 30.00     Types: Cigarettes     Quit date: 1985    Smokeless tobacco: Never Used    Alcohol use 0.0 oz/week      Comment: maybe once a year    Drug use: No    Sexual activity: Not Currently     Partners: Male     Other Topics Concern    Not on file     Social History Narrative    Pt's  is   "since 2010, currently lives with her sister and brother in law. She has 3 children ( One son with HTN but otherwise in good health)Sons live in [facility], and daughter in [place], [place]. Patient is retired from childcare work. She still drives a car. Coffee intake 1-2 cups a day. She does not have Living Will or Advanced Directive.        Past Surgical History:   Procedure Laterality Date    APPENDECTOMY  1978 approx    incidental     CATARACT EXTRACTION Bilateral     CHOLECYSTECTOMY  1975 approx    EYE SURGERY Bilateral 2012    cataract w/IOL    FOOT SURGERY Left 10/23/15    left hindfoot calcanectomy and bursectomy    HYSTERECTOMY  1978    SOLANGE     STRABISMUS SURGERY Left at age 1        Review of Systems   HENT: Negative for hearing loss and tinnitus.    Cardiovascular: Negative for palpitations and syncope.   Gastrointestinal: Negative for nausea and vomiting.   Neurological: Positive for dizziness. Negative for syncope, facial asymmetry, weakness and light-headedness.   All other systems reviewed and are negative.      Objective:   /80 (BP Location: Right arm, Patient Position: Sitting, BP Method: Manual)   Pulse 87   Temp 97.7 °F (36.5 °C) (Oral)   Ht 5' 3" (1.6 m)   Wt 92.7 kg (204 lb 4.1 oz)   LMP 03/03/1978 (Within Months)   SpO2 97%   BMI 36.18 kg/m²       Physical Exam   Constitutional: She is oriented to person, place, and time. She appears well-developed and well-nourished.   HENT:   Head: Normocephalic and atraumatic.   Right Ear: Hearing, tympanic membrane, external ear and ear canal normal.   Left Ear: Hearing, tympanic membrane, external ear and ear canal normal.   Cardiovascular: Normal rate, regular rhythm and normal heart sounds.    Neurological: She is alert and oriented to person, place, and time.   + Hallpike maneuver   Skin: She is not diaphoretic. No pallor.   Psychiatric: She has a normal mood and affect. Her speech is normal and behavior is normal.     "   Assessment:       1. Vertigo    2. Other screening mammogram        Plan:       Vickie was seen today for dizziness.    Diagnoses and all orders for this visit:    Vertigo  -     Ambulatory referral to ENT        -     She is stopped taking meclizine yesterday. Discussed with patient this medication.         -     Provided her with home care instructions on vertigo. She is to change position slowly. Will refer to ENT.     Other screening mammogram  -     Mammo Digital Screening Bilateral With CAD; Future    Mammogram scheduled. She will follow up with Dr. Graff to discuss weight loss. She also has been taking trazodone 200 mg and going to sleep at 5:30. Discussed sleep patterns. She will try Melatonin. She want to discuss with Dr. Graff stress incontinence. Appt scheduled for 07/19/2017.     Return if symptoms worsen or fail to improve.

## 2017-07-17 ENCOUNTER — ANESTHESIA (OUTPATIENT)
Dept: SURGERY | Facility: HOSPITAL | Age: 67
End: 2017-07-17
Payer: MEDICARE

## 2017-07-17 ENCOUNTER — HOSPITAL ENCOUNTER (OUTPATIENT)
Facility: HOSPITAL | Age: 67
Discharge: HOME | End: 2017-07-17
Attending: PHYSICAL MEDICINE & REHABILITATION | Admitting: PHYSICAL MEDICINE & REHABILITATION
Payer: MEDICARE

## 2017-07-17 ENCOUNTER — ANESTHESIA EVENT (OUTPATIENT)
Dept: SURGERY | Facility: HOSPITAL | Age: 67
End: 2017-07-17
Payer: MEDICARE

## 2017-07-17 ENCOUNTER — TELEPHONE (OUTPATIENT)
Dept: FAMILY MEDICINE | Facility: CLINIC | Age: 67
End: 2017-07-17

## 2017-07-17 VITALS
DIASTOLIC BLOOD PRESSURE: 92 MMHG | RESPIRATION RATE: 20 BRPM | WEIGHT: 207 LBS | HEART RATE: 99 BPM | SYSTOLIC BLOOD PRESSURE: 155 MMHG | HEIGHT: 63 IN | TEMPERATURE: 98 F | OXYGEN SATURATION: 97 % | BODY MASS INDEX: 36.68 KG/M2

## 2017-07-17 DIAGNOSIS — M47.816 FACET ARTHRITIS OF LUMBAR REGION: Primary | ICD-10-CM

## 2017-07-17 LAB — POCT GLUCOSE: 210 MG/DL (ref 70–110)

## 2017-07-17 PROCEDURE — 36000705 HC OR TIME LEV I EA ADD 15 MIN: Performed by: PHYSICAL MEDICINE & REHABILITATION

## 2017-07-17 PROCEDURE — 25000003 PHARM REV CODE 250: Performed by: PHYSICAL MEDICINE & REHABILITATION

## 2017-07-17 PROCEDURE — 36000704 HC OR TIME LEV I 1ST 15 MIN: Performed by: PHYSICAL MEDICINE & REHABILITATION

## 2017-07-17 PROCEDURE — D9220A PRA ANESTHESIA: Mod: ANES,,, | Performed by: ANESTHESIOLOGY

## 2017-07-17 PROCEDURE — 71000015 HC POSTOP RECOV 1ST HR: Performed by: PHYSICAL MEDICINE & REHABILITATION

## 2017-07-17 PROCEDURE — 25000003 PHARM REV CODE 250: Performed by: ANESTHESIOLOGY

## 2017-07-17 PROCEDURE — 63600175 PHARM REV CODE 636 W HCPCS: Performed by: PHYSICAL MEDICINE & REHABILITATION

## 2017-07-17 PROCEDURE — 25500020 PHARM REV CODE 255: Performed by: PHYSICAL MEDICINE & REHABILITATION

## 2017-07-17 PROCEDURE — 25000003 PHARM REV CODE 250: Performed by: NURSE ANESTHETIST, CERTIFIED REGISTERED

## 2017-07-17 PROCEDURE — 37000008 HC ANESTHESIA 1ST 15 MINUTES: Performed by: PHYSICAL MEDICINE & REHABILITATION

## 2017-07-17 PROCEDURE — D9220A PRA ANESTHESIA: Mod: CRNA,,, | Performed by: NURSE ANESTHETIST, CERTIFIED REGISTERED

## 2017-07-17 PROCEDURE — 37000009 HC ANESTHESIA EA ADD 15 MINS: Performed by: PHYSICAL MEDICINE & REHABILITATION

## 2017-07-17 PROCEDURE — 63600175 PHARM REV CODE 636 W HCPCS: Performed by: NURSE ANESTHETIST, CERTIFIED REGISTERED

## 2017-07-17 RX ORDER — PROPOFOL 10 MG/ML
VIAL (ML) INTRAVENOUS
Status: DISCONTINUED | OUTPATIENT
Start: 2017-07-17 | End: 2017-07-17

## 2017-07-17 RX ORDER — METHYLPREDNISOLONE ACETATE 40 MG/ML
INJECTION, SUSPENSION INTRA-ARTICULAR; INTRALESIONAL; INTRAMUSCULAR; SOFT TISSUE
Status: DISCONTINUED | OUTPATIENT
Start: 2017-07-17 | End: 2017-07-17 | Stop reason: HOSPADM

## 2017-07-17 RX ORDER — BUPIVACAINE HYDROCHLORIDE 5 MG/ML
INJECTION, SOLUTION EPIDURAL; INTRACAUDAL
Status: DISCONTINUED | OUTPATIENT
Start: 2017-07-17 | End: 2017-07-17 | Stop reason: HOSPADM

## 2017-07-17 RX ORDER — TRAMADOL HYDROCHLORIDE 50 MG/1
50 TABLET ORAL ONCE AS NEEDED
Status: DISCONTINUED | OUTPATIENT
Start: 2017-07-17 | End: 2017-07-17 | Stop reason: HOSPADM

## 2017-07-17 RX ORDER — LIDOCAINE HYDROCHLORIDE 10 MG/ML
INJECTION, SOLUTION EPIDURAL; INFILTRATION; INTRACAUDAL; PERINEURAL
Status: DISCONTINUED | OUTPATIENT
Start: 2017-07-17 | End: 2017-07-17 | Stop reason: HOSPADM

## 2017-07-17 RX ORDER — LIDOCAINE HCL/PF 100 MG/5ML
SYRINGE (ML) INTRAVENOUS
Status: DISCONTINUED | OUTPATIENT
Start: 2017-07-17 | End: 2017-07-17

## 2017-07-17 RX ORDER — GLYCOPYRROLATE 0.2 MG/ML
INJECTION INTRAMUSCULAR; INTRAVENOUS
Status: DISCONTINUED | OUTPATIENT
Start: 2017-07-17 | End: 2017-07-17

## 2017-07-17 RX ORDER — ONDANSETRON 2 MG/ML
4 INJECTION INTRAMUSCULAR; INTRAVENOUS EVERY 8 HOURS PRN
Status: DISCONTINUED | OUTPATIENT
Start: 2017-07-17 | End: 2017-07-17 | Stop reason: HOSPADM

## 2017-07-17 RX ORDER — MIDAZOLAM HYDROCHLORIDE 1 MG/ML
INJECTION, SOLUTION INTRAMUSCULAR; INTRAVENOUS
Status: DISCONTINUED | OUTPATIENT
Start: 2017-07-17 | End: 2017-07-17

## 2017-07-17 RX ORDER — SODIUM CHLORIDE, SODIUM LACTATE, POTASSIUM CHLORIDE, CALCIUM CHLORIDE 600; 310; 30; 20 MG/100ML; MG/100ML; MG/100ML; MG/100ML
INJECTION, SOLUTION INTRAVENOUS CONTINUOUS
Status: DISCONTINUED | OUTPATIENT
Start: 2017-07-17 | End: 2017-07-17 | Stop reason: HOSPADM

## 2017-07-17 RX ORDER — LIDOCAINE 50 MG/G
PATCH TOPICAL
Status: DISCONTINUED | OUTPATIENT
Start: 2017-07-17 | End: 2017-07-17 | Stop reason: HOSPADM

## 2017-07-17 RX ORDER — FENTANYL CITRATE 50 UG/ML
25 INJECTION, SOLUTION INTRAMUSCULAR; INTRAVENOUS EVERY 5 MIN PRN
Status: DISCONTINUED | OUTPATIENT
Start: 2017-07-17 | End: 2017-07-17 | Stop reason: HOSPADM

## 2017-07-17 RX ORDER — SODIUM CHLORIDE 0.9 % (FLUSH) 0.9 %
3 SYRINGE (ML) INJECTION
Status: DISCONTINUED | OUTPATIENT
Start: 2017-07-17 | End: 2017-07-17 | Stop reason: HOSPADM

## 2017-07-17 RX ADMIN — PROPOFOL 50 MG: 10 INJECTION, EMULSION INTRAVENOUS at 01:07

## 2017-07-17 RX ADMIN — LIDOCAINE HYDROCHLORIDE 95 MG: 20 INJECTION, SOLUTION INTRAVENOUS at 12:07

## 2017-07-17 RX ADMIN — GLYCOPYRROLATE 0.2 MG: 0.2 INJECTION, SOLUTION INTRAMUSCULAR; INTRAVENOUS at 12:07

## 2017-07-17 RX ADMIN — SODIUM CHLORIDE, SODIUM LACTATE, POTASSIUM CHLORIDE, AND CALCIUM CHLORIDE: .6; .31; .03; .02 INJECTION, SOLUTION INTRAVENOUS at 12:07

## 2017-07-17 RX ADMIN — MIDAZOLAM HYDROCHLORIDE 2 MG: 1 INJECTION, SOLUTION INTRAMUSCULAR; INTRAVENOUS at 12:07

## 2017-07-17 RX ADMIN — PROPOFOL 50 MG: 10 INJECTION, EMULSION INTRAVENOUS at 12:07

## 2017-07-17 NOTE — DISCHARGE SUMMARY
PROCEDURE:  Bilateral L4/5, L5/S1 facet joint injection under fluoroscopy.    REASON FOR PROCEDURE:  Lumbar Facet Arthropathy    PHYSICIAN: Ramesh Licona MD    Continue home meds    Resume home diet    Activities as tolerated.     The patient was monitored after the procedure.  Patient was given post procedure and discharge instructions to follow at home.  We will see the patient back in two weeks or the patient may call to inform of status.     The patient was discharged in a stable condition

## 2017-07-17 NOTE — ANESTHESIA PREPROCEDURE EVALUATION
07/17/2017  Vickie Hoskins is a 66 y.o., female.    Pre-op Assessment    I have reviewed the Patient Summary Reports.      I have reviewed the Medications.     Review of Systems  Anesthesia Hx:  No problems with previous Anesthesia  History of prior surgery of interest to airway management or planning: Denies Family Hx of Anesthesia complications.   Denies Personal Hx of Anesthesia complications.   Hematology/Oncology:  Hematology Normal   Oncology Normal     EENT/Dental:EENT/Dental Normal   Cardiovascular:   Hypertension ECG has been reviewed.    Pulmonary:   COPD Shortness of breath Sleep Apnea    Hepatic/GI:   GERD Liver Disease,    Musculoskeletal:   Arthritis     Neurological:   Neuromuscular Disease, Headaches    Endocrine:   Diabetes Hypothyroidism    Psych:   Psychiatric History          Physical Exam  General:  Well nourished, Obesity    Airway/Jaw/Neck:  Airway Findings: Mouth Opening: Normal Tongue: Normal  General Airway Assessment: Adult, Average  Mallampati: III  TM Distance: 4 - 6 cm  Jaw/Neck Findings:  Neck Findings:  Short Neck, Girth Increased     Eyes/Ears/Nose:  EYES/EARS/NOSE FINDINGS: Normal   Dental:  DENTAL FINDINGS: Normal   Chest/Lungs:  Chest/Lungs Findings: Clear to auscultation, Normal Respiratory Rate     Heart/Vascular:  Heart Findings: Rate: Normal  Rhythm: Regular Rhythm  Heart murmur: negative Vascular Findings: Normal    Abdomen:  Abdomen Findings: Normal    Musculoskeletal:  Musculoskeletal Findings: Normal   Skin:  Skin Findings: Normal    Mental Status:  Mental Status Findings: Normal        Anesthesia Plan  Type of Anesthesia, risks & benefits discussed:  Anesthesia Type:  MAC  Patient's Preference:   Intra-op Monitoring Plan: standard ASA monitors  Intra-op Monitoring Plan Comments:   Post Op Pain Control Plan: multimodal analgesia  Post Op Pain Control Plan  Comments:   Induction:   IV  Beta Blocker:  Patient is on a Beta-Blocker and has received one dose within the past 24 hours (No further documentation required).       Informed Consent: Patient understands risks and agrees with Anesthesia plan.  Questions answered. Anesthesia consent signed with patient.  ASA Score: 3     Day of Surgery Review of History & Physical:    H&P update referred to the surgeon.         Ready For Surgery From Anesthesia Perspective.

## 2017-07-17 NOTE — OP NOTE
Lumbar Facet Joint Injection Under Fluoroscopy    Time-out taken to identify patient and procedure side prior to starting the procedure.                                                                 PROCEDURE:  Bilateral L4/5, L5/S1 facet joint injection under fluoroscopy.    REASON FOR PROCEDURE:  Lumbar Facet Arthropathy    PHYSICIAN: Ramesh Licona MD    ASSISTANTS: See OR note.    MEDICATIONS INJECTED:  2ml Depomedrol 80mg total and Bupivacaine 0.5%. 2ml per level.    LOCAL ANESTHETIC USED:   Xylocaine 1% 6ml. 2-3ml per site.    SEDATION MEDICATIONS: None    ESTIMATED BLOOD LOSS:  None.    COMPLICATIONS:  None.    TECHNIQUE:   Lying in a prone position, the patient was prepped and draped in the usual sterile fashion using ChloraPrep and fenestrated drape.  The level was determined under fluoroscopic guidance.  Local anesthetic was given by going down to the hub of the 27-gauge 1.25in needle and raising a wheel.  The 5in 22-gauge needle was introduced into the Bilateral L4/5, L5/S1 facet joint.  Negative pressure applied to make sure that there was no intravascular placement.  Medication was then injected slowly.  The patient tolerated the procedure well.     The patient was monitored after the procedure.  Patient was given post procedure and discharge instructions to follow at home.  We will see the patient back in two weeks or the patient may call to inform of status.     The patient was discharged in a stable condition

## 2017-07-17 NOTE — TRANSFER OF CARE
"Anesthesia Transfer of Care Note    Patient: Vickie Hoskins    Procedure(s) Performed: Procedure(s) (LRB):  INJECTION-FACET-L4/5-L5/S1 (Bilateral)    Patient location: OPS    Anesthesia Type: MAC    Transport from OR: Transported from OR on room air with adequate spontaneous ventilation    Post pain: adequate analgesia    Post assessment: no apparent anesthetic complications    Post vital signs: stable    Level of consciousness: awake    Nausea/Vomiting: no nausea/vomiting    Complications: none    Transfer of care protocol was followed      Last vitals:   Visit Vitals  BP (!) 152/93 (BP Location: Right arm, Patient Position: Lying, BP Method: Automatic)   Pulse 101   Temp 36 °C (96.8 °F) (Skin)   Resp 18   Ht 5' 3" (1.6 m)   Wt 93.9 kg (207 lb)   LMP 03/03/1978 (Within Months)   SpO2 95%   Breastfeeding? No   BMI 36.67 kg/m²     "

## 2017-07-17 NOTE — DISCHARGE INSTRUCTIONS
Dr Licona's Discharge Instructions      · Do not drive, drink alcohol, or operate machinery for 12-24 hours.  · Resume your regular diet as tolerated.  · You may shower beginning tomorrow.  · No tub baths or swimming pools for 3 days (do not soak injection sites).  · Soreness, tightness, and muscle spasms are common in the area around the injection sites.  · Apply ice packs to the injection sites as needed (15-20 minutes per hour while awake).  If soreness is present after 3 days, you may change to a warm compress.  · Weakness and numbness is common in the areas at and below the injection sites.  If you have weakness, be very cautious of falls (up to bathroom only and with help) until strength returns.  · Follow up with your doctor in 2 weeks, or as instructed.  · If you experience severe headaches, fever, redness, swelling to the injection sites, or have any problems or questions, contact your doctor at [phone] or [phone].         ___yes                 ______If you had facet blocks, please understand that this is a diagnostic procedure.  It is a test to determine options for further treatments.  The important thing is to note if you had any pain relief, and if so, how long did it last.                     _________If you had a steroid injection, pain relief may not be immediate.  Pain may occur several hours after your procedure (as the medicine wears off).  Pain should start to improve after 2-3 days (as the steroids start to work).  You should feel your best approximately 10-14 days after your injection.   Fall Prevention  Millions of people fall every year and injure themselves. You may have had anesthesia or sedation which may increase your risk of falling. You may have health issues that put you at an increased risk of falling.     Here are ways to reduce your risk of falling.  ·   · Make your home safe by keeping walkways clear of objects you may trip over.  · Use  non-slip pads under rugs. Do not use area rugs or small throw rugs.  · Use non-slip mats in bathtubs and showers.  · Install handrails and lights on staircases.  · Do not walk in poorly lit areas.  · Do not stand on chairs or wobbly ladders.  · Use caution when reaching overhead or looking upward. This position can cause a loss of balance.  · Be sure your shoes fit properly, have non-slip bottoms and are in good condition.   · Wear shoes both inside and out. Avoid going barefoot or wearing slippers.  · Be cautious when going up and down stairs, curbs, and when walking on uneven sidewalks.  · If your balance is poor, consider using a cane or walker.  · If your fall was related to alcohol use, stop or limit alcohol intake.   · If your fall was related to use of sleeping medicines, talk to your doctor about this. You may need to reduce your dosage at bedtime if you awaken during the night to go to the bathroom.    · To reduce the need for nighttime bathroom trips:  ¨ Avoid drinking fluids for several hours before going to bed  ¨ Empty your bladder before going to bed  ¨ Men can keep a urinal at the bedside  · Stay as active as you can. Balance, flexibility, strength, and endurance all come from exercise. They all play a role in preventing falls. Ask your healthcare provider which types of activity are right for you.  · Get your vision checked on a regular basis.  · If you have pets, know where they are before you stand up or walk so you don't trip over them.  · Use night lights.

## 2017-07-18 NOTE — ANESTHESIA POSTPROCEDURE EVALUATION
"Anesthesia Post Evaluation    Patient: Vickie Hoskins    Procedure(s) Performed: Procedure(s) (LRB):  INJECTION-FACET-L4/5-L5/S1 (Bilateral)    Final Anesthesia Type: MAC  Patient location during evaluation: PACU  Patient participation: Yes- Able to Participate  Level of consciousness: awake and alert and oriented  Post-procedure vital signs: reviewed and stable  Pain management: adequate  Airway patency: patent  PONV status at discharge: No PONV  Anesthetic complications: no      Cardiovascular status: blood pressure returned to baseline, hemodynamically stable and hypertensive  Respiratory status: unassisted, spontaneous ventilation and room air  Hydration status: euvolemic  Follow-up not needed.        Visit Vitals  BP (!) 155/92 (BP Location: Right arm, Patient Position: Sitting, BP Method: Automatic)   Pulse 99   Temp 36.5 °C (97.7 °F) (Oral)   Resp 20   Ht 5' 3" (1.6 m)   Wt 93.9 kg (207 lb)   LMP 03/03/1978 (Within Months)   SpO2 97%   Breastfeeding? No   BMI 36.67 kg/m²       Pain/Leah Score: Pain Assessment Performed: Yes (7/17/2017  2:13 PM)  Presence of Pain: denies (7/17/2017  2:13 PM)  Leah Score: 10 (7/17/2017  2:13 PM)  Modified Leah Score: 20 (7/17/2017  2:13 PM)      "

## 2017-07-19 ENCOUNTER — OFFICE VISIT (OUTPATIENT)
Dept: FAMILY MEDICINE | Facility: CLINIC | Age: 67
End: 2017-07-19
Payer: MEDICARE

## 2017-07-19 ENCOUNTER — HOSPITAL ENCOUNTER (OUTPATIENT)
Dept: RADIOLOGY | Facility: HOSPITAL | Age: 67
Discharge: HOME | End: 2017-07-19
Attending: NURSE PRACTITIONER
Payer: MEDICARE

## 2017-07-19 VITALS
SYSTOLIC BLOOD PRESSURE: 124 MMHG | DIASTOLIC BLOOD PRESSURE: 70 MMHG | HEART RATE: 80 BPM | BODY MASS INDEX: 37.69 KG/M2 | TEMPERATURE: 98 F | WEIGHT: 204.81 LBS | HEIGHT: 62 IN | OXYGEN SATURATION: 94 %

## 2017-07-19 DIAGNOSIS — N39.3 STRESS INCONTINENCE IN FEMALE: Primary | ICD-10-CM

## 2017-07-19 DIAGNOSIS — E11.3299 DIABETES MELLITUS WITH BACKGROUND RETINOPATHY: Chronic | ICD-10-CM

## 2017-07-19 DIAGNOSIS — E66.01 SEVERE OBESITY (BMI 35.0-35.9 WITH COMORBIDITY): ICD-10-CM

## 2017-07-19 DIAGNOSIS — F33.2 MAJOR DEPRESSIVE DISORDER, RECURRENT, SEVERE WITHOUT PSYCHOTIC FEATURES: Chronic | ICD-10-CM

## 2017-07-19 DIAGNOSIS — Z12.31 OTHER SCREENING MAMMOGRAM: ICD-10-CM

## 2017-07-19 DIAGNOSIS — E78.5 HYPERLIPIDEMIA, UNSPECIFIED HYPERLIPIDEMIA TYPE: Chronic | ICD-10-CM

## 2017-07-19 PROCEDURE — 77067 SCR MAMMO BI INCL CAD: CPT | Mod: TC

## 2017-07-19 PROCEDURE — 4010F ACE/ARB THERAPY RXD/TAKEN: CPT | Mod: S$GLB,,, | Performed by: FAMILY MEDICINE

## 2017-07-19 PROCEDURE — 1126F AMNT PAIN NOTED NONE PRSNT: CPT | Mod: S$GLB,,, | Performed by: FAMILY MEDICINE

## 2017-07-19 PROCEDURE — 99214 OFFICE O/P EST MOD 30 MIN: CPT | Mod: S$GLB,,, | Performed by: FAMILY MEDICINE

## 2017-07-19 PROCEDURE — 3045F PR MOST RECENT HEMOGLOBIN A1C LEVEL 7.0-9.0%: CPT | Mod: S$GLB,,, | Performed by: FAMILY MEDICINE

## 2017-07-19 PROCEDURE — 77063 BREAST TOMOSYNTHESIS BI: CPT | Mod: 26,,, | Performed by: RADIOLOGY

## 2017-07-19 PROCEDURE — 1157F ADVNC CARE PLAN IN RCRD: CPT | Mod: S$GLB,,, | Performed by: FAMILY MEDICINE

## 2017-07-19 PROCEDURE — 77067 SCR MAMMO BI INCL CAD: CPT | Mod: 26,,, | Performed by: RADIOLOGY

## 2017-07-19 PROCEDURE — 99999 PR PBB SHADOW E&M-EST. PATIENT-LVL V: CPT | Mod: PBBFAC,,, | Performed by: FAMILY MEDICINE

## 2017-07-19 PROCEDURE — 1159F MED LIST DOCD IN RCRD: CPT | Mod: S$GLB,,, | Performed by: FAMILY MEDICINE

## 2017-07-19 RX ORDER — OXYBUTYNIN CHLORIDE 5 MG/1
5 TABLET, EXTENDED RELEASE ORAL DAILY
Qty: 30 TABLET | Refills: 0 | Status: SHIPPED | OUTPATIENT
Start: 2017-07-19 | End: 2018-07-19

## 2017-07-19 RX ORDER — OXYBUTYNIN CHLORIDE 5 MG/1
5 TABLET, EXTENDED RELEASE ORAL DAILY
Qty: 90 TABLET | Refills: 0 | Status: SHIPPED | OUTPATIENT
Start: 2017-07-19 | End: 2017-07-19 | Stop reason: SDUPTHER

## 2017-07-19 RX ORDER — BUPROPION HYDROCHLORIDE 75 MG/1
75 TABLET ORAL DAILY
Qty: 90 TABLET | Refills: 0 | Status: SHIPPED | OUTPATIENT
Start: 2017-07-19 | End: 2017-07-19 | Stop reason: SDUPTHER

## 2017-07-19 NOTE — PROGRESS NOTES
Office Visit    Patient Name: Vickie Hoskins    : 1950  MRN: [id number]    Subjective:  Vickie is a 66 y.o. female who presents today for:    bladder leakage and discuss weight gain      This patient has multiple medical diagnoses as noted below.  This patient is known to me and to this clinic. She reports increased bladder leakage that occurs with increased abdominal pressure.  Patient is concerned about weight gain.  She has been working on diet control.  She has not noticed any weight loss.  We discussed possible medications that she can utilize.  She is currently on Wellbutrin for depression.  We discussed possible use of contrary in order to help with weight loss.  We discussed that she would not benefit from stimulant medication at this time as she does have a significant problem list.  And side effects of this medication outweigh the benefits.      Active Problem List  Patient Active Problem List   Diagnosis    Essential hypertension    Severe obesity (BMI 35.0-35.9 with comorbidity)    Acquired hypothyroidism    Insomnia    Osteoarthritis    AMBAR (obstructive sleep apnea)    Migraine    Diabetes mellitus with background retinopathy    Pseudophakia    Exotropia    Amblyopia    Type 2 diabetes mellitus, uncontrolled, with neuropathy    NAFLD (nonalcoholic fatty liver disease)    Open angle with borderline findings, low risk    Long-term insulin use in type 2 diabetes    Esophageal reflux    History of anemia    Major depressive disorder, recurrent, severe without psychotic features    Hyperlipidemia    Rotator cuff impingement syndrome of right shoulder    Tear of right glenoid labrum    Bursitis of right shoulder    Tortuous aorta    Hx of wheezing    Tobacco dependence in remission    Moderate COPD (chronic obstructive pulmonary disease)    Facet arthritis of lumbar region       Past Surgical History  Past Surgical History:   Procedure Laterality Date    APPENDECTOMY   1978 approx    incidental     CATARACT EXTRACTION Bilateral     CHOLECYSTECTOMY  1975 approx    EYE SURGERY Bilateral 2012    cataract w/IOL    FOOT SURGERY Left 10/23/15    left hindfoot calcanectomy and bursectomy    HYSTERECTOMY  1978    SOLANGE     STRABISMUS SURGERY Left at age 1        Family History  Family History   Problem Relation Age of Onset    Cancer Mother      unsure what kind    Heart disease Father     Hypertension Father     Alcohol abuse Father     Cancer Maternal Aunt      bone cancer    Diabetes Maternal Aunt     Hypertension Sister     Alcohol abuse Sister     Hypertension Son     Alcohol abuse Son      DWI    Hypertension Son     Alcohol abuse Son      DWI    Hypertension Paternal Uncle     Heart disease Paternal Uncle     Hypertension Maternal Grandmother     Heart disease Maternal Grandmother     Cancer Maternal Aunt     Mental illness Cousin     No Known Problems Brother     No Known Problems Maternal Uncle     No Known Problems Paternal Aunt     No Known Problems Maternal Grandfather     No Known Problems Paternal Grandmother     No Known Problems Paternal Grandfather     Stroke Neg Hx     Mental retardation Neg Hx     Drug abuse Neg Hx     Amblyopia Neg Hx     Blindness Neg Hx     Cataracts Neg Hx     Glaucoma Neg Hx     Macular degeneration Neg Hx     Retinal detachment Neg Hx     Strabismus Neg Hx     Thyroid disease Neg Hx        Social History  Social History     Social History    Marital status:      Spouse name: N/A    Number of children: N/A    Years of education: N/A     Occupational History    Not on file.     Social History Main Topics    Smoking status: Former Smoker     Packs/day: 1.00     Years: 30.00     Types: Cigarettes     Quit date: 1/14/1985    Smokeless tobacco: Never Used    Alcohol use 0.0 oz/week      Comment: maybe once a year    Drug use: No    Sexual activity: Not Currently     Partners: Male     Other Topics  "Concern    Not on file     Social History Narrative    Pt's  is  since , currently lives with her sister and brother in law. She has 3 children ( One son with HTN but otherwise in good health)Sons live in [facility], and daughter in [place], [place]. Patient is retired from childcare work. She still drives a car. Coffee intake 1-2 cups a day. She does not have Living Will or Advanced Directive.        Current Medications  Medications reviewed and updated.     Allergies   Review of patient's allergies indicates:   Allergen Reactions    Pcn [penicillins] Other (See Comments)     Pt was told by mother that she was allergic to PCN.    Silver nitrate Other (See Comments)     Remained on skin more than week       Review of Systems (Pertinent positives)  Review of Systems   Constitutional: Negative for activity change, appetite change, fatigue, fever and unexpected weight change.   HENT: Negative.  Negative for ear discharge, ear pain, rhinorrhea and sore throat.    Eyes: Negative.    Respiratory: Negative for apnea, cough, chest tightness, shortness of breath and wheezing.    Cardiovascular: Negative for chest pain, palpitations and leg swelling.   Gastrointestinal: Negative for abdominal distention, abdominal pain, constipation, diarrhea and vomiting.   Endocrine: Negative for cold intolerance, heat intolerance, polydipsia and polyuria.   Genitourinary: Positive for urgency (bladder leakage). Negative for decreased urine volume, menstrual problem, vaginal bleeding, vaginal discharge and vaginal pain.   Musculoskeletal: Negative.    Skin: Negative for rash.   Neurological: Negative for dizziness and headaches.   Hematological: Does not bruise/bleed easily.   Psychiatric/Behavioral: Negative for agitation, sleep disturbance and suicidal ideas.       /70 (BP Location: Left arm, Patient Position: Sitting, BP Method: Manual)   Pulse 80   Temp 97.7 °F (36.5 °C) (Oral)   Ht 5' 2" (1.575 m) "   Wt 92.9 kg (204 lb 12.9 oz)   LMP 03/03/1978 (Within Months)   SpO2 (!) 94%   BMI 37.46 kg/m²     Physical Exam   Constitutional: She is oriented to person, place, and time. She appears well-developed and well-nourished.   HENT:   Head: Normocephalic and atraumatic.   Eyes: Conjunctivae and EOM are normal. Pupils are equal, round, and reactive to light.   Neck: Normal range of motion.   Neurological: She is alert and oriented to person, place, and time.   Skin: Skin is warm and dry.   Psychiatric: She has a normal mood and affect. Her behavior is normal.   Vitals reviewed.      Health Maintenance  Health Maintenance Topics with due status: Not Due       Topic Last Completion Date    Colonoscopy 02/05/2014    TETANUS VACCINE 04/29/2015    Influenza Vaccine 10/11/2016    Eye Exam 02/23/2017    Lipid Panel 04/17/2017    Hemoglobin A1c 04/17/2017    DEXA SCAN 04/17/2017    Foot Exam 07/03/2017       Assessment/Plan:  Vickie Hoskins is a 66 y.o. female who presents today for :    Vickie was seen today for bladder leakage and discuss weight gain.    Diagnoses and all orders for this visit:    Stress incontinence in female  -     Discontinue: oxybutynin (DITROPAN-XL) 5 MG TR24; Take 1 tablet (5 mg total) by mouth once daily.  -     oxybutynin (DITROPAN-XL) 5 MG TR24; Take 1 tablet (5 mg total) by mouth once daily.  -     Medication since into the pharmacy.  One medications and [employer] the other wounds into her local pharmacy.  Diabetes mellitus with background retinopathy  -   She will need to continue with her diabetic control at this time.  She is on several medications related to her diabetes that can hinder her weight loss.  At this time she is currently on Invokana.  She should continue with this medication.  Hyperlipidemia, unspecified hyperlipidemia type  -  Pt is currently stable on medication regimen.  Continue current therapy as scheduled.  Contact office with any questions about adjustments on  medications.     Severe obesity (BMI 35.0-35.9 with comorbidity)  -     naltrexone-bupropion 8-90 mg TbSR; Take 1 tablet by mouth 2 (two) times daily.  -     I have discussed the common side effects of this medication with the patient and answered all of the questions they had at the time of this visit regarding this medication.    Major depressive disorder, recurrent, severe without psychotic features  -     buPROPion (WELLBUTRIN) 75 MG tablet; Take 1 tablet (75 mg total) by mouth Daily.  -  I am concerned about changes in her mood with adjustment on her medication at this time she will continue with contrave.  I have adjusted the dosage of her Wellbutrin at this time.  I will reassess in 4 weeks.  Patient was notified to contact the clinic if she notices any worsening depressive symptoms.        No Follow-up on file.

## 2017-07-20 RX ORDER — BUPROPION HYDROCHLORIDE 75 MG/1
75 TABLET ORAL DAILY
Qty: 90 TABLET | Refills: 0 | Status: SHIPPED | OUTPATIENT
Start: 2017-07-20 | End: 2017-11-11 | Stop reason: SDUPTHER

## 2017-07-24 ENCOUNTER — PATIENT OUTREACH (OUTPATIENT)
Dept: OTHER | Facility: OTHER | Age: 67
End: 2017-07-24

## 2017-07-24 NOTE — PROGRESS NOTES
Last 5 Patient Entered Redings Current 30 Day Average: 133/73     Recent Readings 7/22/2017 7/22/2017 7/12/2017 7/6/2017 7/6/2017    Systolic BP (mmHg) 139 135 136 139 144    Diastolic BP (mmHg) 62 73 74 81 82    Pulse 85 86 82 80 80        Follow up with Ms. Vickie Hoskins completed. Ms. Hoskins is doing well. Patient reports that she has not been able to walk as much as she likes because its been so hot. She has trouble breathing when she walks in the heat because of COPD. Patient reports that she is maintaining a low sodium diet. Patient did not have any further questions or concerns. I will follow up in a few weeks to see how she is doing and progressing.

## 2017-08-03 RX ORDER — BUPROPION HYDROCHLORIDE 150 MG/1
TABLET ORAL
Qty: 90 TABLET | Refills: 1 | Status: SHIPPED | OUTPATIENT
Start: 2017-08-03

## 2017-08-21 ENCOUNTER — PATIENT OUTREACH (OUTPATIENT)
Dept: OTHER | Facility: OTHER | Age: 67
End: 2017-08-21

## 2017-08-21 NOTE — PROGRESS NOTES
Last 5 Patient Entered Redings Current 30 Day Average: 129/71     Recent Readings 8/15/2017 8/5/2017 7/30/2017 7/22/2017 7/22/2017    Systolic BP (mmHg) 131 120 126 139 135    Diastolic BP (mmHg) 80 70 70 62 73    Pulse 99 95 75 85 86          Follow up with Ms. Vickie Kimby completed. Patient reports that she moved to [place]. She will remain active until she is able to establish care with another physician in the area. Patient understands that she will no be able to participate after switching. Patient did not have any further questions or concerns. I will follow up in a few weeks to see how she is doing and progressing.

## 2017-08-22 RX ORDER — MELOXICAM 7.5 MG/1
TABLET ORAL
Qty: 180 TABLET | Refills: 0 | Status: SHIPPED | OUTPATIENT
Start: 2017-08-22

## 2017-09-12 ENCOUNTER — PATIENT OUTREACH (OUTPATIENT)
Dept: OTHER | Facility: OTHER | Age: 67
End: 2017-09-12

## 2017-09-12 NOTE — PROGRESS NOTES
"Last 5 Patient Entered Redings Current 30 Day Average: 135/75     Recent Readings 8/27/2017 8/27/2017 8/15/2017 8/5/2017 7/30/2017    Systolic BP (mmHg) 139 144 131 120 126    Diastolic BP (mmHg) 69 80 80 70 70    Pulse 92 94 99 95 75        9/12 - Patient requests call back on 9/25 to make sure cuff is working properly before being removed from the program. Ms. Hoskins moved to [facility] and will establish care with a new PCP on 9/22.    9/25 - Ms. Hoskins was able to establish care with a new PCP and reports that she was "very thorough". Ms. Hoskins needs to charge her BP cuff. She is enjoying living in [place]. She will be removed from the [facility].   "

## 2017-11-11 DIAGNOSIS — F33.2 MAJOR DEPRESSIVE DISORDER, RECURRENT, SEVERE WITHOUT PSYCHOTIC FEATURES: Chronic | ICD-10-CM

## 2017-11-13 RX ORDER — BUPROPION HYDROCHLORIDE 75 MG/1
TABLET ORAL
Qty: 90 TABLET | Refills: 0 | Status: SHIPPED | OUTPATIENT
Start: 2017-11-13

## 2017-12-28 DIAGNOSIS — E11.9 TYPE 2 DIABETES MELLITUS WITHOUT COMPLICATION: ICD-10-CM

## 2018-03-09 DIAGNOSIS — E11.9 TYPE 2 DIABETES MELLITUS WITHOUT COMPLICATION: ICD-10-CM

## 2018-06-22 DIAGNOSIS — E11.9 TYPE 2 DIABETES MELLITUS WITHOUT COMPLICATION: ICD-10-CM

## (undated) DEVICE — NDL SPINAL 22GX5

## (undated) DEVICE — KIT NERVE BLOCK PREP BAPTIST

## (undated) DEVICE — SYR 10CC LUER LOCK

## (undated) DEVICE — CHLORAPREP W TINT 26ML APPL

## (undated) DEVICE — GLOVE SURGICAL LATEX SZ 7